# Patient Record
Sex: FEMALE | Race: BLACK OR AFRICAN AMERICAN | Employment: UNEMPLOYED | URBAN - METROPOLITAN AREA
[De-identification: names, ages, dates, MRNs, and addresses within clinical notes are randomized per-mention and may not be internally consistent; named-entity substitution may affect disease eponyms.]

---

## 2017-01-09 ENCOUNTER — TELEPHONE (OUTPATIENT)
Dept: ONCOLOGY | Age: 72
End: 2017-01-09

## 2017-01-09 NOTE — TELEPHONE ENCOUNTER
[employer]  Medical Oncology at [facility]    01/09/17- Patient's sister, Alo Hogan, called because patient is suppose to have labs drawn today, but [facility] is closed today due to the weather. She stated patient is doing well, no complaints. Informed her it would be okay to have labs drawn tomorrow. Dr. Veronica Pruitt notified.

## 2017-01-12 ENCOUNTER — TELEPHONE (OUTPATIENT)
Dept: ONCOLOGY | Age: 72
End: 2017-01-12

## 2017-01-12 NOTE — TELEPHONE ENCOUNTER
[employer]  Medical Oncology at [address] [place] [address]    01/12/17- Informed Shanon Miramontes of results she verbalized understanding and reported patient is feeling good. No new concerns or questions.

## 2017-01-26 ENCOUNTER — TELEPHONE (OUTPATIENT)
Dept: ONCOLOGY | Age: 72
End: 2017-01-26

## 2017-01-26 NOTE — TELEPHONE ENCOUNTER
[employer]  Medical Oncology at [address] [place] [address]    01/26/17- Attempted to call patient's sister, Alo Hogan, but voicemail was full and unable to leave a message.   Will try again later.    -Platelet count 088

## 2017-01-26 NOTE — TELEPHONE ENCOUNTER
Patients sister Kaia Holman called and left a voicemail returning the call from Dr. Mindy Novoa office.  Call back number for Kaia Holman is [phone]

## 2017-01-26 NOTE — TELEPHONE ENCOUNTER
[employer]  Medical Oncology at [address] [place] [address]    01/26/17- Informed Yamile that patient's platelets are stable at 254. She denies further questions or concerns.

## 2017-02-10 ENCOUNTER — OFFICE VISIT (OUTPATIENT)
Dept: ONCOLOGY | Age: 72
End: 2017-02-10

## 2017-02-10 ENCOUNTER — TELEPHONE (OUTPATIENT)
Dept: ONCOLOGY | Age: 72
End: 2017-02-10

## 2017-02-10 VITALS
HEIGHT: 64 IN | DIASTOLIC BLOOD PRESSURE: 63 MMHG | HEART RATE: 57 BPM | WEIGHT: 136 LBS | BODY MASS INDEX: 23.22 KG/M2 | RESPIRATION RATE: 18 BRPM | TEMPERATURE: 95.8 F | SYSTOLIC BLOOD PRESSURE: 133 MMHG

## 2017-02-10 DIAGNOSIS — D69.3 ACUTE ITP (HCC): Primary | ICD-10-CM

## 2017-02-10 NOTE — PROGRESS NOTES
[address] [facility] [place] at [facility]  [phone]    Hematology / Oncology Follow Up    Reason for Visit:   Simeon Waggoner is a 66 y.o. female who is seen for follow up of ITP. Treatment History:    · Diagnosed 6/3/2016  · IVIG 6/3/2016  · Prednisone from 6/3/2016 - approximately 11/15/2016  · IVIG 8/16/2016 - Relapsed once prednisone tapered to 5mg  · Rituximab weekly 9/15/2016 - 10/6/2016 for a total of 4 cycles     History of Present Illness:   She returns for follow up. She reports feeling well. Denies bruising/bleeding. No complaints today. Sister present today. PAST HISTORY: The following sections were reviewed and updated in the EMR as appropriate: PMH, SH, FH, Medications, Allergies. No Known Allergies       Review of Systems: A complete review of systems was obtained, reviewed, and scanned into the EMR. Pertinent findings reviewed above. Physical Exam:     Visit Vitals    /63 (BP 1 Location: Right arm, BP Patient Position: Sitting)    Pulse (!) 57    Temp 95.8 °F (35.4 °C) (Temporal)    Resp 18    Ht 5' 4\" (1.626 m)    Wt 136 lb (61.7 kg)    BMI 23.34 kg/m2     General: No distress  Eyes: PERRLA, anicteric sclerae  HENT: Atraumatic, OP clear  Neck: Supple  Lymphatic: No cervical, supraclavicular, or inguinal adenopathy  Respiratory: CTAB, normal respiratory effort  CV: Normal rate, regular rhythm, no murmurs, no peripheral edema  GI: Soft, nontender, nondistended, no masses, no hepatomegaly, no splenomegaly  MS: Normal gait and station. Digits without clubbing or cyanosis. Skin: No rashes, or petechiae. Normal temperature, turgor, and texture. Psych: Alert, oriented, appropriate affect, normal judgment/insight    Results:     Lab Results   Component Value Date/Time    WBC 10.2 10/27/2016 09:49 AM    HGB 12.6 10/27/2016 09:49 AM    HCT 39.7 10/27/2016 09:49 AM    PLATELET 037 47/84/7713 09:49 AM    MCV 77.7 10/27/2016 09:49 AM    ABS. NEUTROPHILS 6.1 10/06/2016 09:23 AM    Hemoglobin (POC) 14.3 10/06/2016 09:24 AM    Hematocrit (POC) 42 10/06/2016 09:24 AM     Lab Results   Component Value Date/Time    Sodium 138 08/17/2016 05:14 AM    Potassium 3.9 08/17/2016 05:14 AM    Chloride 104 08/17/2016 05:14 AM    CO2 27 08/17/2016 05:14 AM    Glucose 114 08/17/2016 05:14 AM    BUN 14 08/17/2016 05:14 AM    Creatinine 0.93 08/17/2016 05:14 AM    GFR est AA >60 08/17/2016 05:14 AM    GFR est non-AA 59 08/17/2016 05:14 AM    Calcium 9.4 08/17/2016 05:14 AM    Sodium (POC) 140 10/06/2016 09:24 AM    Potassium (POC) 3.5 10/06/2016 09:24 AM    Chloride (POC) 101 10/06/2016 09:24 AM    Glucose (POC) 83 10/06/2016 09:24 AM    BUN (POC) 17 10/06/2016 09:24 AM    Creatinine (POC) 1.0 10/06/2016 09:24 AM    Calcium, ionized (POC) 1.21 10/06/2016 09:24 AM     Lab Results   Component Value Date/Time    Bilirubin, total 0.6 08/17/2016 05:14 AM    ALT (SGPT) 20 08/17/2016 05:14 AM    AST (SGOT) 36 08/17/2016 05:14 AM    Alk. phosphatase 44 08/17/2016 05:14 AM    Protein, total 8.5 08/17/2016 05:14 AM    Albumin 3.2 08/17/2016 05:14 AM    Globulin 5.3 08/17/2016 05:14 AM     Lab Results   Component Value Date/Time    Reticulocyte count 0.8 06/03/2016 01:28 PM    Iron % saturation 16 06/04/2016 03:46 AM    Iron 37 06/04/2016 03:46 AM    TIBC 231 06/04/2016 03:46 AM    Ferritin 128 06/03/2016 01:20 PM    Vitamin B12 231 06/03/2016 01:20 PM    Folate 10.7 06/03/2016 01:20 PM    Homocysteine, plasma 8.1 06/04/2016 04:00 PM    Methylmalonic acid 97 06/04/2016 04:00 PM    Haptoglobin 68 06/04/2016 03:46 AM     06/03/2016 01:28 PM    Hep C  virus Ab Interp.  NONREACTIVE 06/04/2016 03:46 AM    HIV 1/2 Interpretation NONREACTIVE 06/03/2016 02:30 PM     Lab Results   Component Value Date/Time    INR 1.0 06/03/2016 01:28 PM    aPTT 33.5 06/03/2016 01:28 PM    Fibrinogen 250 06/03/2016 03:15 PM     PLATELET (K/uL)   Date Value   10/27/2016 190   10/06/2016 159   09/29/2016 183   09/22/2016 176   09/15/2016 127 (L)   09/06/2016 221   08/23/2016 306   08/18/2016 89 (L)   08/17/2016 36 (LL)   08/16/2016 20 (LL)   07/29/2016 231   06/08/2016 231   06/05/2016 66 (L)   06/04/2016 59 (L)   06/04/2016 40 (LL)   06/03/2016 39 (LL)   06/03/2016 3 (LL)     Platelet count: (Full CBC scanned into media)  02/06/2017 242  01/24/2017 254  01/10/2017 278  12/27/2017 243  12/12/2017 247  11/28/2016 250  11/21/2016 250  11/14/2016 254 Prednisone stopped  11/07/2016 247 Pred dropped to 2.5 mg every other day  10/31/2016 210 Pred dropped to 2.5mg   10/27/2016 190 Pred dropped to 5mg  10/17/2016 157    10/10/2016 116 Pred dropped to 10mg 10/12  10/06/2016 159 Rituximab completed  09/29/2016 183 Pred dropped to 20mg   09/22/2016 176  09/15/2016 127 Rituximab weekly started  09/12/2016 129  09/06/2016 221  08/29/2016 269 Pred dropped to 40mg 8/30 08/23/2016 306  08/18/2016 89  08/16/2016 20 Pred increased to 60mg and IVIG x1  08/15/2016 5  07/20/2016 231  07/18/2016 274  Pred dropped to 10 mg 7/20 07/11/2016 269  07/05/2016 186  06/27/2016 146  06/20/2016 230   Pred dropped to 20 mg 6/22 06/14/2016 320 Pred dropped to 40 mg 6/15  06/08/2016 231  06/03/2016 3  Pred started at 60 mg daily and IVIG x1 dose    EGD 6/13/2016 by Dr. Logan Campoverde:    H. Pylori negative   Esophagus:normal, early schatzki's ring that appeared widely patent   Stomach: Small size hiatal hernia, otherwise mucosa within normal.   Duodenum/jejunum: normal    Colonoscopy 6/13/2016 by Dr. Logan Campoverde:    Terminal Ileum: normal   Cecum: normal   Ascending Colon: normal   Transverse Colon: normal   Descending Colon: normal   Sigmoid: normal   Rectum: normal, small internal hemorrhoids       Assessment:   1) ITP, remission  Diagnosed 6/3/2016, responded initially to IVIG and steroids, but relapsed when prednisone tapered to 5mg. Responded once again to IVIG and prednisone, but platelets fell while still on prednisone 40mg.  S/p Rituximab weekly x 4 cycles. She continues feeling well and platelets remain normal. We will decrease lab recheck to every 3 months for surveillance and see her back at that time. 2) Bone health  Continue calcium and vitamin D BID. 3) GIB  Melena resolved. S/p EGD and colonoscopy in past.    4) Vaccine schedule  We will resume vaccines now that she is 3 months out from Rituxin. 1. Pneumococcal vaccine  Recommendation for 2 pneumococcal Vaccines. PCV13 Then at least 8 weeks later PPSV23 And no booster needed     PCV13 received on 8/18/2016  PPSV23 Due after 10/13/2016     2. Haemophilus influenzae vaccine  Recommendation of one dose and no booster needed   Hib received on 8/18/2016     3. Meningococcal vaccine  Recommendation for age > 56 years 2 doses of (a.) Menveo or Menactra 2 months apart With booster every 5 years and (b.) Bexsero or Trumenba 2 times at least 1 month apart then no additional booster needed     (a.) Menveo received 8/18/2016 ; repeat dose due after 10/13/2016 and then booster every 5 years      (b.) Bexsero not available in the hospital; received as  outpatient injection at [facility] on  8/22/2016 ; will need 2nd dose on 1 month later; due 9/23/2016. Prescriptions faxed to [facility] for Menveo, Bexsero and PPSV23.      4. Annual flu vaccine recommended   Okay to proceed now. Prescription sent to [facility].        Plan:     · Vaccines: Menveo, Bexsero, PPSV23 and influenza  · Labs every 3 months: CBC ([facility]-standing order)  · Continue Calcium and vitamin D BID  · Return to clinic in about 3 months    Signed By: Paul Maya MD     February 10, 2017

## 2017-02-10 NOTE — TELEPHONE ENCOUNTER
[facility]  Medical Oncology at [facility]    02/10/17- Vaccine orders faxed to [facility] at [phone], confirmation received. Patient's sister, Dewayne Hatch, notified.

## 2017-02-10 NOTE — MR AVS SNAPSHOT
Visit Information Date & Time Provider Department Dept. Phone Encounter #  
 2/10/2017 10:45 AM Adolph Cole NP [address] at [facility] [address] [id number] Follow-up Instructions Return in about 3 months (around 5/10/2017) for Marco. Your Appointments 5/12/2017 10:45 AM  
ESTABLISHED PATIENT with Adolph Cole NP  
[place] On[facility] at [facility] Appt Note: Marco [address] [facility], [address] [facility] [address]  
[phone]  
  
   
 [address] [facility], [address] [place] [address] Upcoming Health Maintenance Date Due DTaP/Tdap/Td series (1 - Tdap) 8/3/1965 BREAST CANCER SCRN MAMMOGRAM 8/3/1994 ZOSTER VACCINE AGE 60> 8/3/2004 GLAUCOMA SCREENING Q2Y 8/3/2009 OSTEOPOROSIS SCREENING (DEXA) 8/3/2009 MEDICARE YEARLY EXAM 8/3/2009 INFLUENZA AGE 9 TO ADULT 8/1/2016 FOBT Q 1 YEAR AGE 50-75 6/3/2017 Pneumococcal 65+ Low/Medium Risk (2 of 2 - PPSV23) 8/18/2017 Allergies as of 2/10/2017  Review Complete On: 2/10/2017 By: Adolph Cole NP No Known Allergies Current Immunizations  Reviewed on 10/6/2016 Name Date Hib (PRP-T) 8/18/2016  1:49 PM  
 Meningococcal (MCV4O) Vaccine 8/18/2016  1:47 PM  
 Pneumococcal Conjugate (PCV-13) 8/18/2016  1:48 PM  
  
 Not reviewed this visit You Were Diagnosed With   
  
 Codes Comments Acute ITP (HCC)    -  Primary ICD-10-CM: B04.9 ICD-9-CM: 287.31 Vitals BP Pulse Temp Resp Height(growth percentile) 133/63 (BP 1 Location: Right arm, BP Patient Position: Sitting) (!) 57 95.8 °F (35.4 °C) (Temporal) 18 5' 4\" (1.626 m) Weight(growth percentile) BMI OB Status Smoking Status 136 lb (61.7 kg) 23.34 kg/m2 Postmenopausal Never Smoker BMI and BSA Data Body Mass Index Body Surface Area  
 23.34 kg/m 2 1.67 m 2 Preferred Pharmacy Pharmacy Name Phone [facility], [facility] [phone] Your Updated Medication List  
  
   
This list is accurate as of: 2/10/17 11:26 AM.  Always use your most recent med list.  
  
  
  
  
 ondansetron 8 mg disintegrating tablet Commonly known as:  ZOFRAN ODT Take 1 Tab by mouth every eight (8) hours as needed for Nausea. predniSONE 2.5 mg tablet Commonly known as:  Therman Rail Take 1 tab by mouth daily for one week then 1 tab by mouth every other day x 1 week then stop. Follow-up Instructions Return in about 3 months (around 5/10/2017) for Marco. Introducing [facility]! Thu Crawley introduces [employer] patient portal. Now you can access parts of your medical record, email your doctor's office, and request medication refills online. 1. In your internet browser, go to [email or web address] 2. Click on the First Time User? Click Here link in the Sign In box. You will see the New Member Sign Up page. 3. Enter your [employer] Access Code exactly as it appears below. You will not need to use this code after youve completed the sign-up process. If you do not sign up before the expiration date, you must request a new code. · [employer] Access Code: [email or web address] Expires: 5/11/2017 10:56 AM 
 
4. Enter the last four digits of your Social Security Number (xxxx) and Date of Birth (mm/dd/yyyy) as indicated and click Submit. You will be taken to the next sign-up page. 5. Create a Pinstant Karmat ID. This will be your [employer] login ID and cannot be changed, so think of one that is secure and easy to remember. 6. Create a [employer] password. You can change your password at any time. 7. Enter your Password Reset Question and Answer. This can be used at a later time if you forget your password. 8. Enter your e-mail address. You will receive e-mail notification when new information is available in [address] 9. Click Sign Up. You can now view and download portions of your medical record. 10. Click the Download Summary menu link to download a portable copy of your medical information. If you have questions, please visit the Frequently Asked Questions section of the [email or web address] website. Remember, [email or web address] is NOT to be used for urgent needs. For medical emergencies, dial 911. Now available from your iPhone and Android! Please provide this summary of care documentation to your next provider. Your primary care clinician is listed as Martínez Reynolds . If you have any questions after today's visit, please call [phone].

## 2017-02-13 ENCOUNTER — TELEPHONE (OUTPATIENT)
Dept: ONCOLOGY | Age: 72
End: 2017-02-13

## 2017-02-13 NOTE — TELEPHONE ENCOUNTER
[facility] Oncology at [facility]: Call placed to return patient's Tonya Oneill, phone call. Voicemail is full, unable to leave message. 2/16 1115: Call placed to patient's sister, Chapo Gardner. She states [facility] has been unable to order one of the meningitis vaccines but she isn't sure which one. She states she is taking patient to the pharmacy later today to get remainder of vaccines. Call placed to [facility]. They are unable to get Menveo vaccine. Call placed to [address] [facility] [phone] to see if they can get vaccine for patient- spoke with nurse, Nasra Lawson, who states she doesn't have the vaccine but will see if she can get it and call me back. 2/22 Received email from Nasra Lawson with [facility] who is able to get vaccine and bill patient's insurance. She requests that I verify with patient that she wants vaccine and they can get her set up to receive it. 1230: Call placed to Chapo Gardner to advise her that [facility] if that's okay with them. Left message for return call. 1315: Spoke with Chapo Gardner to advise her as above. She states that's perfect and they can go to the Health Dept without an issue. Contacted Elo with [facility] to let her know and she will reach out to patient.

## 2017-02-13 NOTE — TELEPHONE ENCOUNTER
Alley Crooks called and left a voicemail requesting a return call from Ck Jimenez concerning [place].  Call back number for Alley Crooks is [phone]

## 2017-02-22 NOTE — TELEPHONE ENCOUNTER
Patient's sister, Isabel Alfaro, left  requesting a return call from Amelia Lerma as she was calling her back. # [phone]. Thanks.

## 2017-05-12 ENCOUNTER — OFFICE VISIT (OUTPATIENT)
Dept: ONCOLOGY | Age: 72
End: 2017-05-12

## 2017-05-12 VITALS
RESPIRATION RATE: 20 BRPM | HEART RATE: 57 BPM | WEIGHT: 139.6 LBS | DIASTOLIC BLOOD PRESSURE: 57 MMHG | OXYGEN SATURATION: 100 % | SYSTOLIC BLOOD PRESSURE: 135 MMHG | BODY MASS INDEX: 23.83 KG/M2 | TEMPERATURE: 95.5 F | HEIGHT: 64 IN

## 2017-05-12 DIAGNOSIS — D69.3 ACUTE ITP (HCC): Primary | ICD-10-CM

## 2017-05-12 RX ORDER — ATORVASTATIN CALCIUM 20 MG/1
TABLET, FILM COATED ORAL
Refills: 0 | COMMUNITY
Start: 2017-05-09 | End: 2017-05-12 | Stop reason: ALTCHOICE

## 2017-05-12 NOTE — PROGRESS NOTES
[address] [facility] Oncology at [address] [place] [address]  [phone]    Hematology / Oncology Follow Up    Reason for Visit:   Edgardo Dooley is a 66 y.o. female who is seen for follow up of ITP. Treatment History:    · Diagnosed 6/3/2016  · IVIG 6/3/2016  · Prednisone from 6/3/2016 - approximately 11/15/2016  · IVIG 8/16/2016 - Relapsed once prednisone tapered to 5mg  · Rituximab weekly 9/15/2016 - 10/6/2016 for a total of 4 cycles     History of Present Illness:   She reports feeling well. No bleeding. Started a statin for cholesterol wit her PCP recently. No fevers, chills, night sweats, weight loss, adenopathy. Sister present today. PAST HISTORY: The following sections were reviewed and updated in the EMR as appropriate: PMH, SH, FH, Medications, Allergies. No Known Allergies       Review of Systems: A complete review of systems was obtained, reviewed, and scanned into the EMR. Pertinent findings reviewed above. Physical Exam:     Visit Vitals    /57 (BP 1 Location: Left arm, BP Patient Position: Sitting)  Comment: .  Pulse (!) 57    Temp 95.5 °F (35.3 °C) (Temporal)    Resp 20    Ht 5' 4\" (1.626 m)    Wt 139 lb 9.6 oz (63.3 kg)    SpO2 100%    BMI 23.96 kg/m2     General: No distress  Eyes: PERRLA, anicteric sclerae  HENT: Atraumatic, OP clear  Neck: Supple  Lymphatic: No cervical, supraclavicular, or inguinal adenopathy  Respiratory: CTAB, normal respiratory effort  CV: Normal rate, regular rhythm, no murmurs, no peripheral edema  GI: Soft, nontender, nondistended, no masses, no hepatomegaly, no splenomegaly  MS: Normal gait and station. Digits without clubbing or cyanosis. Skin: No rashes, or petechiae. Normal temperature, turgor, and texture.     Psych: Alert, oriented, appropriate affect, normal judgment/insight    Results:     Lab Results   Component Value Date/Time    WBC 10.2 10/27/2016 09:49 AM    HGB 12.6 10/27/2016 09:49 AM    HCT 39.7 10/27/2016 09:49 AM PLATELET 071 97/12/2252 09:49 AM    MCV 77.7 10/27/2016 09:49 AM    ABS. NEUTROPHILS 6.1 10/06/2016 09:23 AM    Hemoglobin (POC) 14.3 10/06/2016 09:24 AM    Hematocrit (POC) 42 10/06/2016 09:24 AM     Lab Results   Component Value Date/Time    Sodium 138 08/17/2016 05:14 AM    Potassium 3.9 08/17/2016 05:14 AM    Chloride 104 08/17/2016 05:14 AM    CO2 27 08/17/2016 05:14 AM    Glucose 114 08/17/2016 05:14 AM    BUN 14 08/17/2016 05:14 AM    Creatinine 0.93 08/17/2016 05:14 AM    GFR est AA >60 08/17/2016 05:14 AM    GFR est non-AA 59 08/17/2016 05:14 AM    Calcium 9.4 08/17/2016 05:14 AM    Sodium (POC) 140 10/06/2016 09:24 AM    Potassium (POC) 3.5 10/06/2016 09:24 AM    Chloride (POC) 101 10/06/2016 09:24 AM    Glucose (POC) 83 10/06/2016 09:24 AM    BUN (POC) 17 10/06/2016 09:24 AM    Creatinine (POC) 1.0 10/06/2016 09:24 AM    Calcium, ionized (POC) 1.21 10/06/2016 09:24 AM     Lab Results   Component Value Date/Time    Bilirubin, total 0.6 08/17/2016 05:14 AM    ALT (SGPT) 20 08/17/2016 05:14 AM    AST (SGOT) 36 08/17/2016 05:14 AM    Alk. phosphatase 44 08/17/2016 05:14 AM    Protein, total 8.5 08/17/2016 05:14 AM    Albumin 3.2 08/17/2016 05:14 AM    Globulin 5.3 08/17/2016 05:14 AM     Lab Results   Component Value Date/Time    Reticulocyte count 0.8 06/03/2016 01:28 PM    Iron % saturation 16 06/04/2016 03:46 AM    Iron 37 06/04/2016 03:46 AM    TIBC 231 06/04/2016 03:46 AM    Ferritin 128 06/03/2016 01:20 PM    Vitamin B12 231 06/03/2016 01:20 PM    Folate 10.7 06/03/2016 01:20 PM    Homocysteine, plasma 8.1 06/04/2016 04:00 PM    Methylmalonic acid 97 06/04/2016 04:00 PM    Haptoglobin 68 06/04/2016 03:46 AM     06/03/2016 01:28 PM    Hep C  virus Ab Interp.  NONREACTIVE 06/04/2016 03:46 AM    HIV 1/2 Interpretation NONREACTIVE 06/03/2016 02:30 PM     Lab Results   Component Value Date/Time    INR 1.0 06/03/2016 01:28 PM    aPTT 33.5 06/03/2016 01:28 PM    Fibrinogen 250 06/03/2016 03:15 PM PLATELET (K/uL)   Date Value   10/27/2016 190   10/06/2016 159   09/29/2016 183   09/22/2016 176   09/15/2016 127 (L)   09/06/2016 221   08/23/2016 306   08/18/2016 89 (L)   08/17/2016 36 (LL)   08/16/2016 20 (LL)   07/29/2016 231   06/08/2016 231   06/05/2016 66 (L)   06/04/2016 59 (L)   06/04/2016 40 (LL)   06/03/2016 39 (LL)   06/03/2016 3 (LL)       Platelet count: (Full CBC scanned into media)  05/08/2017 248  02/06/2017 242  01/24/2017 254  01/10/2017 278  12/27/2017 243  12/12/2017 247  11/28/2016 250  11/21/2016 250  11/14/2016 254 Prednisone stopped  11/07/2016 247 Pred dropped to 2.5 mg every other day  10/31/2016 210 Pred dropped to 2.5mg   10/27/2016 190 Pred dropped to 5mg  10/17/2016 157    10/10/2016 116 Pred dropped to 10mg 10/12  10/06/2016 159 Rituximab completed  09/29/2016 183 Pred dropped to 20mg   09/22/2016 176  09/15/2016 127 Rituximab weekly started  09/12/2016 129  09/06/2016 221  08/29/2016 269 Pred dropped to 40mg 8/30 08/23/2016 306  08/18/2016 89  08/16/2016 20 Pred increased to 60mg and IVIG x1  08/15/2016 5  07/20/2016 231  07/18/2016 274  Pred dropped to 10 mg 7/20  07/11/2016 269  07/05/2016 186  06/27/2016 146  06/20/2016 230   Pred dropped to 20 mg 6/22 06/14/2016 320 Pred dropped to 40 mg 6/15  06/08/2016 231  06/03/2016 3  Pred started at 60 mg daily and IVIG x1 dose    EGD 6/13/2016 by Dr. Terry Louis:    H. Pylori negative   Esophagus:normal, early schatzki's ring that appeared widely patent   Stomach: Small size hiatal hernia, otherwise mucosa within normal.   Duodenum/jejunum: normal    Colonoscopy 6/13/2016 by Dr. Terry Louis:    Terminal Ileum: normal   Cecum: normal   Ascending Colon: normal   Transverse Colon: normal   Descending Colon: normal   Sigmoid: normal   Rectum: normal, small internal hemorrhoids       Assessment:   1) ITP, remission  Diagnosed 6/3/2016, responded initially to IVIG and steroids, but relapsed when prednisone tapered to 5mg.   Responded once again to IVIG and prednisone, but platelets fell while still on prednisone 40mg. S/p Rituximab weekly x 4 cycles. She continues feeling well and platelets remain normal.  Continue to monitor every 3 months. 2) Bone health  Continue vitamin D. 3) GIB  Melena resolved. S/p EGD and colonoscopy in past.    4) Vaccine schedule  As of 3/2017 she has completed the vaccine schedule for pneumoccal, Hflu, and meninogoccal vaccines, in case she needs a splenectomy in the future.     Plan:     · Labs every 3 months: CBC ([facility]-standing order)  · Return to clinic in about 3 months    Signed By: Nuris Johansen MD     May 12, 2017

## 2017-08-10 ENCOUNTER — OFFICE VISIT (OUTPATIENT)
Dept: ONCOLOGY | Age: 72
End: 2017-08-10

## 2017-08-10 VITALS
TEMPERATURE: 95.5 F | HEART RATE: 60 BPM | DIASTOLIC BLOOD PRESSURE: 60 MMHG | BODY MASS INDEX: 24.07 KG/M2 | SYSTOLIC BLOOD PRESSURE: 141 MMHG | OXYGEN SATURATION: 98 % | HEIGHT: 64 IN | WEIGHT: 141 LBS | RESPIRATION RATE: 20 BRPM

## 2017-08-10 DIAGNOSIS — D69.3 ACUTE ITP (HCC): Primary | ICD-10-CM

## 2017-08-10 NOTE — MR AVS SNAPSHOT
Visit Information Date & Time Provider Department Dept. Phone Encounter #  
 8/10/2017 10:30 AM [facility] [address] [place] [address] [id number] Follow-up Instructions Return in about 3 months (around 11/10/2017) for SHANKAR Graham fu.  
 Follow-up and Disposition History Upcoming Health Maintenance Date Due DTaP/Tdap/Td series (1 - Tdap) 8/3/1965 BREAST CANCER SCRN MAMMOGRAM 8/3/1994 ZOSTER VACCINE AGE 60> 6/3/2004 GLAUCOMA SCREENING Q2Y 8/3/2009 OSTEOPOROSIS SCREENING (DEXA) 8/3/2009 MEDICARE YEARLY EXAM 8/3/2009 FOBT Q 1 YEAR AGE 50-75 6/3/2017 INFLUENZA AGE 9 TO ADULT 8/1/2017 Pneumococcal 65+ Low/Medium Risk (2 of 2 - PPSV23) 8/18/2017 Allergies as of 8/10/2017  Review Complete On: 8/10/2017 By: Dario Santos MD  
 No Known Allergies Current Immunizations  Reviewed on 10/6/2016 Name Date Hib (PRP-T) 8/18/2016  1:49 PM  
 Meningococcal (MCV4O) Vaccine 8/18/2016  1:47 PM  
 Pneumococcal Conjugate (PCV-13) 8/18/2016  1:48 PM  
  
 Not reviewed this visit You Were Diagnosed With   
  
 Codes Comments Acute ITP (HCC)    -  Primary ICD-10-CM: R95.0 ICD-9-CM: 287.31 Vitals BP Pulse Temp Resp Height(growth percentile) 141/60 (BP 1 Location: Right arm, BP Patient Position: Sitting) 60 95.5 °F (35.3 °C) (Temporal) 20 5' 4\" (1.626 m) Weight(growth percentile) SpO2 BMI OB Status Smoking Status 141 lb (64 kg) 98% 24.2 kg/m2 Postmenopausal Never Smoker Vitals History BMI and BSA Data Body Mass Index Body Surface Area  
 24.2 kg/m 2 1.7 m 2 Preferred Pharmacy Pharmacy Name Phone [facility], [address] [facility] [phone] Your Updated Medication List  
  
   
This list is accurate as of: 8/10/17 11:12 AM.  Always use your most recent med list.  
  
  
  
  
 PRAVASTATIN PO Take  by mouth. We Performed the Following CBC W/O DIFF [76368 CPT(R)] Follow-up Instructions Return in about 3 months (around 11/10/2017) for SHANKAR Graham.  
  
  
Introducing [facility] SERVICES! [facility] introduces [employer] patient portal. Now you can access parts of your medical record, email your doctor's office, and request medication refills online. 1. In your internet browser, go to [email or web address] 2. Click on the First Time User? Click Here link in the Sign In box. You will see the New Member Sign Up page. 3. Enter your Hipbone Access Code exactly as it appears below. You will not need to use this code after youve completed the sign-up process. If you do not sign up before the expiration date, you must request a new code. · Hipbone Access Code: [email or web address] Expires: 11/8/2017 11:12 AM 
 
4. Enter the last four digits of your Social Security Number (xxxx) and Date of Birth (mm/dd/yyyy) as indicated and click Submit. You will be taken to the next sign-up page. 5. Create a Hipbone ID. This will be your Hipbone login ID and cannot be changed, so think of one that is secure and easy to remember. 6. Create a Hipbone password. You can change your password at any time. 7. Enter your Password Reset Question and Answer. This can be used at a later time if you forget your password. 8. Enter your e-mail address. You will receive e-mail notification when new information is available in [address] 9. Click Sign Up. You can now view and download portions of your medical record. 10. Click the Download Summary menu link to download a portable copy of your medical information. If you have questions, please visit the Frequently Asked Questions section of the Hipbone website. Remember, Hipbone is NOT to be used for urgent needs. For medical emergencies, dial 911. Now available from your iPhone and Android! Please provide this summary of care documentation to your next provider. Your primary care clinician is listed as Sera Lantigua . If you have any questions after today's visit, please call [phone].

## 2017-08-10 NOTE — PROGRESS NOTES
[address] [facility] Oncology at [facility]  [phone]    Hematology / Oncology Follow Up    Reason for Visit:   Radha Portillo is a 67 y.o. female who is seen for follow up of ITP. Treatment History:    · Diagnosed 6/3/2016  · IVIG 6/3/2016  · Prednisone from 6/3/2016 - approximately 11/15/2016  · IVIG 8/16/2016 - Relapsed once prednisone tapered to 5mg  · Rituximab weekly 9/15/2016 - 10/6/2016 for a total of 4 cycles     History of Present Illness:   She has been doing well since our last visit. No bleeding, bruising. Good energy. No fevers, chills, night sweats, weight loss, adenopathy. Sister present today. PAST HISTORY: The following sections were reviewed and updated in the EMR as appropriate: PMH, SH, FH, Medications, Allergies. No Known Allergies       Review of Systems: A complete review of systems was obtained, reviewed, and scanned into the EMR. Pertinent findings reviewed above. Physical Exam:     Visit Vitals    /60 (BP 1 Location: Right arm, BP Patient Position: Sitting)  Comment: .  Pulse 60    Temp 95.5 °F (35.3 °C) (Temporal)    Resp 20    Ht 5' 4\" (1.626 m)    Wt 141 lb (64 kg)    SpO2 98%    BMI 24.2 kg/m2     General: No distress  Eyes: PERRLA, anicteric sclerae  HENT: Atraumatic, OP clear  Neck: Supple  Lymphatic: No cervical, supraclavicular, or inguinal adenopathy  Respiratory: CTAB, normal respiratory effort  CV: Normal rate, regular rhythm, no murmurs, no peripheral edema  GI: Soft, nontender, nondistended, no masses, no hepatomegaly, no splenomegaly  MS: Normal gait and station. Digits without clubbing or cyanosis. Skin: No rashes, or petechiae. Normal temperature, turgor, and texture.     Psych: Alert, oriented, appropriate affect, normal judgment/insight    Results:     Lab Results   Component Value Date/Time    WBC 10.2 10/27/2016 09:49 AM    HGB 12.6 10/27/2016 09:49 AM    HCT 39.7 10/27/2016 09:49 AM    PLATELET 817 10/27/2016 09:49 AM    MCV 77.7 10/27/2016 09:49 AM    ABS. NEUTROPHILS 6.1 10/06/2016 09:23 AM    Hemoglobin (POC) 14.3 10/06/2016 09:24 AM    Hematocrit (POC) 42 10/06/2016 09:24 AM     Lab Results   Component Value Date/Time    Sodium 138 08/17/2016 05:14 AM    Potassium 3.9 08/17/2016 05:14 AM    Chloride 104 08/17/2016 05:14 AM    CO2 27 08/17/2016 05:14 AM    Glucose 114 08/17/2016 05:14 AM    BUN 14 08/17/2016 05:14 AM    Creatinine 0.93 08/17/2016 05:14 AM    GFR est AA >60 08/17/2016 05:14 AM    GFR est non-AA 59 08/17/2016 05:14 AM    Calcium 9.4 08/17/2016 05:14 AM    Sodium (POC) 140 10/06/2016 09:24 AM    Potassium (POC) 3.5 10/06/2016 09:24 AM    Chloride (POC) 101 10/06/2016 09:24 AM    Glucose (POC) 83 10/06/2016 09:24 AM    BUN (POC) 17 10/06/2016 09:24 AM    Creatinine (POC) 1.0 10/06/2016 09:24 AM    Calcium, ionized (POC) 1.21 10/06/2016 09:24 AM     Lab Results   Component Value Date/Time    Bilirubin, total 0.6 08/17/2016 05:14 AM    ALT (SGPT) 20 08/17/2016 05:14 AM    AST (SGOT) 36 08/17/2016 05:14 AM    Alk. phosphatase 44 08/17/2016 05:14 AM    Protein, total 8.5 08/17/2016 05:14 AM    Albumin 3.2 08/17/2016 05:14 AM    Globulin 5.3 08/17/2016 05:14 AM     Lab Results   Component Value Date/Time    Reticulocyte count 0.8 06/03/2016 01:28 PM    Iron % saturation 16 06/04/2016 03:46 AM    TIBC 231 06/04/2016 03:46 AM    Ferritin 128 06/03/2016 01:20 PM    Vitamin B12 231 06/03/2016 01:20 PM    Folate 10.7 06/03/2016 01:20 PM    Homocysteine, plasma 8.1 06/04/2016 04:00 PM    Methylmalonic acid 97 06/04/2016 04:00 PM    Haptoglobin 68 06/04/2016 03:46 AM     06/03/2016 01:28 PM    Hep C  virus Ab Interp.  NONREACTIVE 06/04/2016 03:46 AM    HIV 1/2 Interpretation NONREACTIVE 06/03/2016 02:30 PM     Lab Results   Component Value Date/Time    INR 1.0 06/03/2016 01:28 PM    aPTT 33.5 06/03/2016 01:28 PM    Fibrinogen 250 06/03/2016 03:15 PM     PLATELET (K/uL)   Date Value   10/27/2016 190 10/06/2016 159   09/29/2016 183   09/22/2016 176   09/15/2016 127 (L)   09/06/2016 221   08/23/2016 306   08/18/2016 89 (L)   08/17/2016 36 (LL)   08/16/2016 20 (LL)   07/29/2016 231   06/08/2016 231   06/05/2016 66 (L)   06/04/2016 59 (L)   06/04/2016 40 (LL)   06/03/2016 39 (LL)   06/03/2016 3 (LL)       Platelet count: (Full CBC scanned into media)  08/08/2017 225  05/08/2017 248  02/06/2017 242  01/24/2017 254  01/10/2017 278  12/27/2017 243  12/12/2017 247  11/28/2016 250  11/21/2016 250  11/14/2016 254 Prednisone stopped  11/07/2016 247 Pred dropped to 2.5 mg every other day  10/31/2016 210 Pred dropped to 2.5mg   10/27/2016 190 Pred dropped to 5mg  10/17/2016 157    10/10/2016 116 Pred dropped to 10mg 10/12  10/06/2016 159 Rituximab completed  09/29/2016 183 Pred dropped to 20mg   09/22/2016 176  09/15/2016 127 Rituximab weekly started  09/12/2016 129  09/06/2016 221  08/29/2016 269 Pred dropped to 40mg 8/30 08/23/2016 306  08/18/2016 89  08/16/2016 20 Pred increased to 60mg and IVIG x1  08/15/2016 5  07/20/2016 231  07/18/2016 274  Pred dropped to 10 mg 7/20 07/11/2016 269  07/05/2016 186  06/27/2016 146  06/20/2016 230   Pred dropped to 20 mg 6/22 06/14/2016 320 Pred dropped to 40 mg 6/15  06/08/2016 231  06/03/2016 3  Pred started at 60 mg daily and IVIG x1 dose    EGD 6/13/2016 by Dr. Twin Lindsey:    H. Pylori negative   Esophagus:normal, early schatzki's ring that appeared widely patent   Stomach: Small size hiatal hernia, otherwise mucosa within normal.   Duodenum/jejunum: normal    Colonoscopy 6/13/2016 by Dr. Twin Rouleau:    Terminal Ileum: normal   Cecum: normal   Ascending Colon: normal   Transverse Colon: normal   Descending Colon: normal   Sigmoid: normal   Rectum: normal, small internal hemorrhoids       Assessment:   1) ITP, remission  Diagnosed 6/3/2016, responded initially to IVIG and steroids, but relapsed when prednisone tapered to 5mg.   Responded once again to IVIG and prednisone, but platelets fell while still on prednisone 40mg. S/p Rituximab weekly x 4 cycles and now in remission. She continues feeling well and platelets remain normal.  Continue to monitor every 3 months. 2) Vaccine schedule  As of 3/2017 she has completed the vaccine schedule for pneumoccal, Hflu, and meninogoccal vaccines, in case she needs a splenectomy in the future.     Plan:     · Labs every 3 months: CBC ([facility]-standing order)  · Return to clinic in about 3 months    Signed By: Awa Berkowitz MD     August 10, 2017

## 2017-11-16 ENCOUNTER — OFFICE VISIT (OUTPATIENT)
Dept: ONCOLOGY | Age: 72
End: 2017-11-16

## 2017-11-16 VITALS
DIASTOLIC BLOOD PRESSURE: 61 MMHG | BODY MASS INDEX: 24.75 KG/M2 | RESPIRATION RATE: 20 BRPM | HEART RATE: 63 BPM | TEMPERATURE: 96 F | HEIGHT: 64 IN | SYSTOLIC BLOOD PRESSURE: 141 MMHG | OXYGEN SATURATION: 99 % | WEIGHT: 145 LBS

## 2017-11-16 DIAGNOSIS — D69.3 ACUTE ITP (HCC): Primary | ICD-10-CM

## 2017-11-16 NOTE — PROGRESS NOTES
[facility] [place] at [address] [facility]  Pennie Vargheses: [phone]  F: [phone]      Reason for Visit:   Natalee Miramontes is a 67 y.o. female who is seen for follow up of ITP. Treatment History:    · Diagnosed 6/3/2016  · IVIG 6/3/2016  · Prednisone from 6/3/2016 - approximately 11/15/2016  · IVIG 8/16/2016 - Relapsed once prednisone tapered to 5mg  · Rituximab weekly 9/15/2016 - 10/6/2016 for a total of 4 cycles     History of Present Illness:   She reports doing well. No bleeding. No bruising. No recent infections. No fevers, chills, night sweats, unintentional weight loss, adenopathy. Good energy. Basically no change. Sister present today. PAST HISTORY: The following sections were reviewed and updated in the EMR as appropriate: PMH, SH, FH, Medications, Allergies. No Known Allergies       Review of Systems: A complete review of systems was obtained, reviewed, and scanned into the EMR. Pertinent findings reviewed above. Physical Exam:     Visit Vitals    /61 (BP 1 Location: Right arm, BP Patient Position: Sitting)  Comment: .  Pulse 63    Temp 96 °F (35.6 °C) (Temporal)    Resp 20    Ht 5' 4\" (1.626 m)    Wt 145 lb (65.8 kg)    SpO2 99%    BMI 24.89 kg/m2     General: No distress  Eyes: PERRLA, anicteric sclerae  HENT: Atraumatic, OP clear  Neck: Supple  Lymphatic: No cervical, supraclavicular, or inguinal adenopathy  Respiratory: CTAB, normal respiratory effort  CV: Normal rate, regular rhythm, no murmurs, no peripheral edema  GI: Soft, nontender, nondistended, no masses, no hepatomegaly, no splenomegaly  MS: Normal gait and station. Digits without clubbing or cyanosis. Skin: No rashes, or petechiae. Normal temperature, turgor, and texture.     Psych: Alert, oriented, appropriate affect, normal judgment/insight    Results:     Lab Results   Component Value Date/Time    WBC 10.2 10/27/2016 09:49 AM    HGB 12.6 10/27/2016 09:49 AM    HCT 39.7 10/27/2016 09:49 AM    PLATELET 935 47/55/9026 09:49 AM    MCV 77.7 10/27/2016 09:49 AM    ABS. NEUTROPHILS 6.1 10/06/2016 09:23 AM    Hemoglobin (POC) 14.3 10/06/2016 09:24 AM    Hematocrit (POC) 42 10/06/2016 09:24 AM     Lab Results   Component Value Date/Time    Sodium 138 08/17/2016 05:14 AM    Potassium 3.9 08/17/2016 05:14 AM    Chloride 104 08/17/2016 05:14 AM    CO2 27 08/17/2016 05:14 AM    Glucose 114 08/17/2016 05:14 AM    BUN 14 08/17/2016 05:14 AM    Creatinine 0.93 08/17/2016 05:14 AM    GFR est AA >60 08/17/2016 05:14 AM    GFR est non-AA 59 08/17/2016 05:14 AM    Calcium 9.4 08/17/2016 05:14 AM    Sodium (POC) 140 10/06/2016 09:24 AM    Potassium (POC) 3.5 10/06/2016 09:24 AM    Chloride (POC) 101 10/06/2016 09:24 AM    Glucose (POC) 83 10/06/2016 09:24 AM    BUN (POC) 17 10/06/2016 09:24 AM    Creatinine (POC) 1.0 10/06/2016 09:24 AM    Calcium, ionized (POC) 1.21 10/06/2016 09:24 AM     Lab Results   Component Value Date/Time    Bilirubin, total 0.6 08/17/2016 05:14 AM    ALT (SGPT) 20 08/17/2016 05:14 AM    AST (SGOT) 36 08/17/2016 05:14 AM    Alk. phosphatase 44 08/17/2016 05:14 AM    Protein, total 8.5 08/17/2016 05:14 AM    Albumin 3.2 08/17/2016 05:14 AM    Globulin 5.3 08/17/2016 05:14 AM     Lab Results   Component Value Date/Time    Reticulocyte count 0.8 06/03/2016 01:28 PM    Iron % saturation 16 06/04/2016 03:46 AM    TIBC 231 06/04/2016 03:46 AM    Ferritin 128 06/03/2016 01:20 PM    Vitamin B12 231 06/03/2016 01:20 PM    Folate 10.7 06/03/2016 01:20 PM    Homocysteine, plasma 8.1 06/04/2016 04:00 PM    Methylmalonic acid 97 06/04/2016 04:00 PM    Haptoglobin 68 06/04/2016 03:46 AM     06/03/2016 01:28 PM    Hep C  virus Ab Interp.  NONREACTIVE 06/04/2016 03:46 AM    HIV 1/2 Interpretation NONREACTIVE 06/03/2016 02:30 PM     Lab Results   Component Value Date/Time    INR 1.0 06/03/2016 01:28 PM    aPTT 33.5 06/03/2016 01:28 PM    Fibrinogen 250 06/03/2016 03:15 PM     PLATELET (K/uL)   Date Value   10/27/2016 190   10/06/2016 159   09/29/2016 183   09/22/2016 176   09/15/2016 127 (L)   09/06/2016 221   08/23/2016 306   08/18/2016 89 (L)   08/17/2016 36 (LL)   08/16/2016 20 (LL)   07/29/2016 231   06/08/2016 231   06/05/2016 66 (L)   06/04/2016 59 (L)   06/04/2016 40 (LL)   06/03/2016 39 (LL)   06/03/2016 3 (LL)       Platelet count: (Full CBC scanned into media)  11/14/2017 237  08/08/2017 225  05/08/2017 248  02/06/2017 242  01/24/2017 254  01/10/2017 278  12/27/2017 243  12/12/2017 247  11/28/2016 250  11/21/2016 250  11/14/2016 254 Prednisone stopped  11/07/2016 247 Pred dropped to 2.5 mg every other day  10/31/2016 210 Pred dropped to 2.5mg   10/27/2016 190 Pred dropped to 5mg  10/17/2016 157    10/10/2016 116 Pred dropped to 10mg 10/12  10/06/2016 159 Rituximab completed  09/29/2016 183 Pred dropped to 20mg   09/22/2016 176  09/15/2016 127 Rituximab weekly started  09/12/2016 129  09/06/2016 221  08/29/2016 269 Pred dropped to 40mg 8/30 08/23/2016 306  08/18/2016 89  08/16/2016 20 Pred increased to 60mg and IVIG x1  08/15/2016 5  07/20/2016 231  07/18/2016 274  Pred dropped to 10 mg 7/20 07/11/2016 269  07/05/2016 186  06/27/2016 146  06/20/2016 230   Pred dropped to 20 mg 6/22 06/14/2016 320 Pred dropped to 40 mg 6/15  06/08/2016 231  06/03/2016 3  Pred started at 60 mg daily and IVIG x1 dose    EGD 6/13/2016 by Dr. Elmore See:    H. Pylori negative   Esophagus:normal, early schatzki's ring that appeared widely patent   Stomach: Small size hiatal hernia, otherwise mucosa within normal.   Duodenum/jejunum: normal    Colonoscopy 6/13/2016 by Dr. Elmore See:    Terminal Ileum: normal   Cecum: normal   Ascending Colon: normal   Transverse Colon: normal   Descending Colon: normal   Sigmoid: normal   Rectum: normal, small internal hemorrhoids       Assessment:   1) ITP, remission  Diagnosed 6/3/2016, responded initially to IVIG and steroids, but relapsed when prednisone tapered to 5mg. Responded once again to IVIG and prednisone, but platelets fell while still on prednisone 40mg. S/p Rituximab weekly x 4 cycles and now in remission. She continues feeling well and platelets remain normal.  Continue to monitor every 3 months. 2) Vaccine schedule  As of 3/2017 she has completed the vaccine schedule for pneumoccal, Hflu, and meninogoccal vaccines, in case she needs a splenectomy in the future.     Plan:     · Labs every 3 months: CBC ([facility]-standing order)  · Return to clinic in about 3 months    Signed By: Won Agustin MD     November 16, 2017

## 2017-11-16 NOTE — MR AVS SNAPSHOT
Visit Information Date & Time Provider Department Dept. Phone Encounter #  
 11/16/2017 10:30 AM [facility]erv[facility] [address] [place] [address] [id number] Follow-up Instructions Return in about 3 months (around 2/16/2018) for SHANKAR Graham fu. Upcoming Health Maintenance Date Due DTaP/Tdap/Td series (1 - Tdap) 8/3/1965 BREAST CANCER SCRN MAMMOGRAM 8/3/1994 ZOSTER VACCINE AGE 60> 6/3/2004 GLAUCOMA SCREENING Q2Y 8/3/2009 OSTEOPOROSIS SCREENING (DEXA) 8/3/2009 MEDICARE YEARLY EXAM 8/3/2009 FOBT Q 1 YEAR AGE 50-75 6/3/2017 Influenza Age 5 to Adult 8/1/2017 Pneumococcal 65+ Low/Medium Risk (2 of 2 - PPSV23) 8/18/2017 Allergies as of 11/16/2017  Review Complete On: 11/16/2017 By: Eldon He LPN No Known Allergies Current Immunizations  Reviewed on 10/6/2016 Name Date Hib (PRP-T) 8/18/2016  1:49 PM  
 Meningococcal (MCV4O) Vaccine 8/18/2016  1:47 PM  
 Pneumococcal Conjugate (PCV-13) 8/18/2016  1:48 PM  
  
 Not reviewed this visit You Were Diagnosed With   
  
 Codes Comments Acute ITP (HCC)    -  Primary ICD-10-CM: J76.2 ICD-9-CM: 287.31 Vitals BP Pulse Temp Resp Height(growth percentile) Weight(growth percentile) 141/61 (BP 1 Location: Right arm, BP Patient Position: Sitting) 63 96 °F (35.6 °C) (Temporal) 20 5' 4\" (1.626 m) 145 lb (65.8 kg) SpO2 BMI OB Status Smoking Status 99% 24.89 kg/m2 Postmenopausal Never Smoker Vitals History BMI and BSA Data Body Mass Index Body Surface Area  
 24.89 kg/m 2 1.72 m 2 Preferred Pharmacy Pharmacy Name Phone [facility], [address] [facility] [phone] Your Updated Medication List  
  
Notice  As of 11/16/2017 11:07 AM  
 You have not been prescribed any medications. Follow-up Instructions Return in about 3 months (around 2/16/2018) for SHANKAR Graham.  
  
  
Introducing [facility] & HEALTH SERVICES! Derrell Reed introduces [employer] patient portal. Now you can access parts of your medical record, email your doctor's office, and request medication refills online. 1. In your internet browser, go to [email or web address] 2. Click on the First Time User? Click Here link in the Sign In box. You will see the New Member Sign Up page. 3. Enter your [employer] Access Code exactly as it appears below. You will not need to use this code after youve completed the sign-up process. If you do not sign up before the expiration date, you must request a new code. · [employer] Access Code: [email or web address] Expires: 2/14/2018 11:07 AM 
 
4. Enter the last four digits of your Social Security Number (xxxx) and Date of Birth (mm/dd/yyyy) as indicated and click Submit. You will be taken to the next sign-up page. 5. Create a K[employer] ID. This will be your [employer] login ID and cannot be changed, so think of one that is secure and easy to remember. 6. Create a [employer] password. You can change your password at any time. 7. Enter your Password Reset Question and Answer. This can be used at a later time if you forget your password. 8. Enter your e-mail address. You will receive e-mail notification when new information is available in [address] 9. Click Sign Up. You can now view and download portions of your medical record. 10. Click the Download Summary menu link to download a portable copy of your medical information. If you have questions, please visit the Frequently Asked Questions section of the [employer] website. Remember, [employer] is NOT to be used for urgent needs. For medical emergencies, dial 911. Now available from your iPhone and Android! Please provide this summary of care documentation to your next provider. Your primary care clinician is listed as Tana Carrera.  If you have any questions after today's visit, please call [phone].

## 2018-02-07 ENCOUNTER — TELEPHONE (OUTPATIENT)
Dept: ONCOLOGY | Age: 73
End: 2018-02-07

## 2018-02-07 NOTE — TELEPHONE ENCOUNTER
[address] at [place]  [phone]    02/07/18- Patient's sister, Amada Sharif, stated patient is suppose to have labs drawn prior to her follow up appointment on 2/22. [employer] [employer] told her that they needed a new lab order from us. Call placed to [facility] [place], informed them we had faxed a standing lab order to them in November. However, I did notice the stop date on the order was incorrect. Updated order faxed to [facility] [employer] [place] at [phone], confirmation received.

## 2018-02-12 ENCOUNTER — TELEPHONE (OUTPATIENT)
Dept: ONCOLOGY | Age: 73
End: 2018-02-12

## 2018-02-12 NOTE — TELEPHONE ENCOUNTER
[employer] at [facility]  [phone]    02/12/18- Phone call placed to pt to remind pt to have labs drawn prior to her follow up appointment with . Pt verbalized understanding.

## 2018-02-22 ENCOUNTER — OFFICE VISIT (OUTPATIENT)
Dept: ONCOLOGY | Age: 73
End: 2018-02-22

## 2018-02-22 VITALS
DIASTOLIC BLOOD PRESSURE: 78 MMHG | OXYGEN SATURATION: 99 % | WEIGHT: 147.2 LBS | TEMPERATURE: 97.9 F | SYSTOLIC BLOOD PRESSURE: 128 MMHG | BODY MASS INDEX: 25.13 KG/M2 | RESPIRATION RATE: 14 BRPM | HEART RATE: 62 BPM | HEIGHT: 64 IN

## 2018-02-22 DIAGNOSIS — D69.3 ACUTE ITP (HCC): Primary | ICD-10-CM

## 2018-02-22 NOTE — PROGRESS NOTES
Identified pt with two pt identifiers(name and ). Reviewed record in preparation for visit and have obtained necessary documentation. Chief Complaint   Patient presents with    Follow-up        Health Maintenance Due   Topic    DTaP/Tdap/Td series (1 - Tdap)    BREAST CANCER SCRN MAMMOGRAM     ZOSTER VACCINE AGE 60>     GLAUCOMA SCREENING Q2Y     OSTEOPOROSIS SCREENING (DEXA)     MEDICARE YEARLY EXAM     FOBT Q 1 YEAR AGE 54-65     Pneumococcal 65+ Low/Medium Risk (2 of 2 - PPSV23)     HM reviewed w/patient. Depression Screening:  No flowsheet data found. Learning Assessment:  No flowsheet data found. Abuse Screening:  No flowsheet data found. Fall Risk  No flowsheet data found. Coordination of Care Questionnaire:  :   1) Have you been to an emergency room, urgent care clinic since your last visit? no   Hospitalized since your last visit? no             2. Have seen or consulted any other health care provider since your last visit? No  If yes, where when, and reason for visit? 3) Do you have an Advanced Directive/ Living Will in place? No  If no, would you like information No    Patient is accompanied by sister I have received verbal consent from Martin Villanueva to discuss any/all medical information while they are present in the room.

## 2018-02-22 NOTE — PROGRESS NOTES
[facility] [place] [facility]  [address] [facility], [address] [address] Ernesto Ogden Tom: [phone]  F: [phone]      Reason for Visit:   Sisi Ivy is a 67 y.o. female who is seen for follow up of ITP. Treatment History:    · Diagnosed 6/3/2016  · IVIG 6/3/2016  · Prednisone from 6/3/2016 - approximately 11/15/2016  · IVIG 8/16/2016 - Relapsed once prednisone tapered to 5mg  · Rituximab weekly 9/15/2016 - 10/6/2016 for a total of 4 cycles     History of Present Illness:   She reports feeling well. No recent infections. No bleeding. No bruising. No recent infections. No fevers, chills, night sweats, unintentional weight loss, adenopathy. Sister present today. PAST HISTORY: The following sections were reviewed and updated in the EMR as appropriate: PMH, SH, FH, Medications, Allergies. No Known Allergies       Review of Systems: A complete review of systems was obtained, reviewed, and scanned into the EMR. Pertinent findings reviewed above. Physical Exam:     Visit Vitals    /78 (BP 1 Location: Left arm, BP Patient Position: Sitting)    Pulse 62    Temp 97.9 °F (36.6 °C) (Oral)    Resp 14    Ht 5' 4\" (1.626 m)    Wt 147 lb 3.2 oz (66.8 kg)    SpO2 99%    BMI 25.27 kg/m2     General: No distress  Eyes: PERRLA, anicteric sclerae  HENT: Atraumatic, OP clear  Neck: Supple  Lymphatic: No cervical, supraclavicular, or inguinal adenopathy  Respiratory: CTAB, normal respiratory effort  CV: Normal rate, regular rhythm, no murmurs, no peripheral edema  GI: Soft, nontender, nondistended, no masses, no hepatomegaly, no splenomegaly  MS: Normal gait and station. Digits without clubbing or cyanosis. Skin: No rashes, or petechiae. Normal temperature, turgor, and texture.     Psych: Alert, oriented, appropriate affect, normal judgment/insight    Results:     Lab Results   Component Value Date/Time    WBC 10.2 10/27/2016 09:49 AM    HGB 12.6 10/27/2016 09:49 AM HCT 39.7 10/27/2016 09:49 AM    PLATELET 188 06/55/9214 09:49 AM    MCV 77.7 (L) 10/27/2016 09:49 AM    ABS. NEUTROPHILS 6.1 10/06/2016 09:23 AM    Hemoglobin (POC) 14.3 10/06/2016 09:24 AM    Hematocrit (POC) 42 10/06/2016 09:24 AM     Lab Results   Component Value Date/Time    Sodium 138 08/17/2016 05:14 AM    Potassium 3.9 08/17/2016 05:14 AM    Chloride 104 08/17/2016 05:14 AM    CO2 27 08/17/2016 05:14 AM    Glucose 114 (H) 08/17/2016 05:14 AM    BUN 14 08/17/2016 05:14 AM    Creatinine 0.93 08/17/2016 05:14 AM    GFR est AA >60 08/17/2016 05:14 AM    GFR est non-AA 59 (L) 08/17/2016 05:14 AM    Calcium 9.4 08/17/2016 05:14 AM    Sodium (POC) 140 10/06/2016 09:24 AM    Potassium (POC) 3.5 10/06/2016 09:24 AM    Chloride (POC) 101 10/06/2016 09:24 AM    Glucose (POC) 83 10/06/2016 09:24 AM    BUN (POC) 17 10/06/2016 09:24 AM    Creatinine (POC) 1.0 10/06/2016 09:24 AM    Calcium, ionized (POC) 1.21 10/06/2016 09:24 AM     Lab Results   Component Value Date/Time    Bilirubin, total 0.6 08/17/2016 05:14 AM    ALT (SGPT) 20 08/17/2016 05:14 AM    AST (SGOT) 36 08/17/2016 05:14 AM    Alk. phosphatase 44 (L) 08/17/2016 05:14 AM    Protein, total 8.5 (H) 08/17/2016 05:14 AM    Albumin 3.2 (L) 08/17/2016 05:14 AM    Globulin 5.3 (H) 08/17/2016 05:14 AM     Lab Results   Component Value Date/Time    Reticulocyte count 0.8 06/03/2016 01:28 PM    Iron % saturation 16 (L) 06/04/2016 03:46 AM    TIBC 231 (L) 06/04/2016 03:46 AM    Ferritin 128 06/03/2016 01:20 PM    Vitamin B12 231 06/03/2016 01:20 PM    Folate 10.7 06/03/2016 01:20 PM    Homocysteine, plasma 8.1 06/04/2016 04:00 PM    Methylmalonic acid 97 06/04/2016 04:00 PM    Haptoglobin 68 06/04/2016 03:46 AM     06/03/2016 01:28 PM    Hep C  virus Ab Interp.  NONREACTIVE 06/04/2016 03:46 AM    HIV 1/2 Interpretation NONREACTIVE 06/03/2016 02:30 PM     Lab Results   Component Value Date/Time    INR 1.0 06/03/2016 01:28 PM    aPTT 33.5 (H) 06/03/2016 01:28 PM Fibrinogen 250 06/03/2016 03:15 PM     PLATELET (K/uL)   Date Value   10/27/2016 190   10/06/2016 159   09/29/2016 183   09/22/2016 176   09/15/2016 127 (L)   09/06/2016 221   08/23/2016 306   08/18/2016 89 (L)   08/17/2016 36 (LL)   08/16/2016 20 (LL)   07/29/2016 231   06/08/2016 231   06/05/2016 66 (L)   06/04/2016 59 (L)   06/04/2016 40 (LL)   06/03/2016 39 (LL)   06/03/2016 3 (LL)       Platelet count: (Full CBC scanned into media)  02/19/2018 279  11/14/2017 237  08/08/2017 225  05/08/2017 248  02/06/2017 242  01/24/2017 254  01/10/2017 278  12/27/2017 243  12/12/2017 247  11/28/2016 250  11/21/2016 250  11/14/2016 254 Prednisone stopped  11/07/2016 247 Pred dropped to 2.5 mg every other day  10/31/2016 210 Pred dropped to 2.5mg   10/27/2016 190 Pred dropped to 5mg  10/17/2016 157    10/10/2016 116 Pred dropped to 10mg 10/12  10/06/2016 159 Rituximab completed  09/29/2016 183 Pred dropped to 20mg   09/22/2016 176  09/15/2016 127 Rituximab weekly started  09/12/2016 129  09/06/2016 221  08/29/2016 269 Pred dropped to 40mg 8/30 08/23/2016 306  08/18/2016 89  08/16/2016 20 Pred increased to 60mg and IVIG x1  08/15/2016 5  07/20/2016 231  07/18/2016 274  Pred dropped to 10 mg 7/20 07/11/2016 269  07/05/2016 186  06/27/2016 146  06/20/2016 230   Pred dropped to 20 mg 6/22 06/14/2016 320 Pred dropped to 40 mg 6/15  06/08/2016 231  06/03/2016 3  Pred started at 60 mg daily and IVIG x1 dose    EGD 6/13/2016 by Dr. Mika Farmer:    H. Pylori negative   Esophagus:normal, early schatzki's ring that appeared widely patent   Stomach: Small size hiatal hernia, otherwise mucosa within normal.   Duodenum/jejunum: normal    Colonoscopy 6/13/2016 by Dr. Mika Farmer:    Terminal Ileum: normal   Cecum: normal   Ascending Colon: normal   Transverse Colon: normal   Descending Colon: normal   Sigmoid: normal   Rectum: normal, small internal hemorrhoids       Assessment:   1) ITP, remission  Diagnosed 6/3/2016, responded initially to IVIG and steroids, but relapsed when prednisone tapered to 5mg. Responded once again to IVIG and prednisone, but relapsed while still on prednisone 40mg. S/p Rituximab weekly x 4 cycles and now in remission. She continues feeling well and platelets remain normal.  Continue to monitor every 3 months. 2) Vaccine schedule  As of 3/2017 she has completed the vaccine schedule for pneumoccal, Hflu, and meninogoccal vaccines, in case she needs a splenectomy in the future.     Plan:     · Labs every 3-4 months: CBC ([facility]-standing order)  · Return to clinic in about 3-4 months    Signed By: Brenton Mo MD

## 2018-05-22 LAB
BASOPHILS # BLD AUTO: 0.1 X10E3/UL (ref 0–0.2)
BASOPHILS NFR BLD AUTO: 1 %
EOSINOPHIL # BLD AUTO: 0.1 X10E3/UL (ref 0–0.4)
EOSINOPHIL NFR BLD AUTO: 3 %
ERYTHROCYTE [DISTWIDTH] IN BLOOD BY AUTOMATED COUNT: 15.5 % (ref 12.3–15.4)
HCT VFR BLD AUTO: 37.1 % (ref 34–46.6)
HGB BLD-MCNC: 12.4 G/DL (ref 11.1–15.9)
IMM GRANULOCYTES # BLD: 0 X10E3/UL (ref 0–0.1)
IMM GRANULOCYTES NFR BLD: 0 %
LYMPHOCYTES # BLD AUTO: 2 X10E3/UL (ref 0.7–3.1)
LYMPHOCYTES NFR BLD AUTO: 47 %
MCH RBC QN AUTO: 25.7 PG (ref 26.6–33)
MCHC RBC AUTO-ENTMCNC: 33.4 G/DL (ref 31.5–35.7)
MCV RBC AUTO: 77 FL (ref 79–97)
MONOCYTES # BLD AUTO: 0.3 X10E3/UL (ref 0.1–0.9)
MONOCYTES NFR BLD AUTO: 6 %
NEUTROPHILS # BLD AUTO: 1.9 X10E3/UL (ref 1.4–7)
NEUTROPHILS NFR BLD AUTO: 43 %
PLATELET # BLD AUTO: 258 X10E3/UL (ref 150–379)
RBC # BLD AUTO: 4.83 X10E6/UL (ref 3.77–5.28)
WBC # BLD AUTO: 4.3 X10E3/UL (ref 3.4–10.8)

## 2018-05-23 ENCOUNTER — OFFICE VISIT (OUTPATIENT)
Dept: ONCOLOGY | Age: 73
End: 2018-05-23

## 2018-05-23 VITALS
SYSTOLIC BLOOD PRESSURE: 144 MMHG | BODY MASS INDEX: 25.95 KG/M2 | OXYGEN SATURATION: 99 % | HEART RATE: 58 BPM | WEIGHT: 152 LBS | HEIGHT: 64 IN | TEMPERATURE: 95.6 F | DIASTOLIC BLOOD PRESSURE: 68 MMHG | RESPIRATION RATE: 18 BRPM

## 2018-05-23 DIAGNOSIS — Z86.2 HISTORY OF ITP: Primary | ICD-10-CM

## 2018-05-23 NOTE — MR AVS SNAPSHOT
[address] 
 
 
 [address] [facility], [address] 
[phone] Patient: Chapin Taylor MRN: [id number] LDO:8/7/4073 Visit Information Date & Time Provider Department Dept. Phone Encounter #  
 5/23/2018  9:30 AM MD [facility] at [address] [place] [address] [id number] Follow-up Instructions Return in about 4 months (around 9/23/2018) for SHANKAR Graham fu.  
 Follow-up and Disposition History Your Appointments 9/26/2018 10:30 AM  
ESTABLISHED PATIENT with MD [facility] at [address] [place] [address] Appt Note: 4 mo fu, Santos [address] [facility], [address] [address]  
[phone]  
  
   
 [address] [facility], [facility] [address] Upcoming Health Maintenance Date Due DTaP/Tdap/Td series (1 - Tdap) 8/3/1965 BREAST CANCER SCRN MAMMOGRAM 8/3/1994 ZOSTER VACCINE AGE 60> 6/3/2004 GLAUCOMA SCREENING Q2Y 8/3/2009 Bone Densitometry (Dexa) Screening 8/3/2009 FOBT Q 1 YEAR AGE 50-75 6/3/2017 Pneumococcal 65+ Low/Medium Risk (2 of 2 - PPSV23) 8/18/2017 Influenza Age 5 to Adult 8/1/2018 Allergies as of 5/23/2018  Review Complete On: 5/23/2018 By: Veda Galicia MD  
 No Known Allergies Current Immunizations  Reviewed on 2/22/2018 Name Date Hib (PRP-T) 8/18/2016  1:49 PM  
 Meningococcal (MCV4O) Vaccine 8/18/2016  1:47 PM  
 Pneumococcal Conjugate (PCV-13) 8/18/2016  1:48 PM  
  
 Not reviewed this visit You Were Diagnosed With   
  
 Codes Comments History of ITP    -  Primary ICD-10-CM: Z86.2 ICD-9-CM: V12.3 Vitals BP Pulse Temp Resp Height(growth percentile) 144/68 (BP 1 Location: Left arm, BP Patient Position: Sitting) (!) 58 95.6 °F (35.3 °C) (Temporal) 18 5' 4\" (1.626 m) Weight(growth percentile) SpO2 BMI OB Status Smoking Status 152 lb (68.9 kg) 99% 26.09 kg/m2 Postmenopausal Never Smoker Vitals History BMI and BSA Data Body Mass Index Body Surface Area 26.09 kg/m 2 1.76 m 2 Your Updated Medication List  
  
Notice  As of 5/23/2018  9:56 AM  
 You have not been prescribed any medications. We Performed the Following CBC WITH AUTOMATED DIFF [82983 CPT(R)] Follow-up Instructions Return in about 4 months (around 9/23/2018) for SHANKAR Graham.  
  
  
Introducing [facility] & HEALTH SERVICES! [facility] introduces [employer] patient portal. Now you can access parts of your medical record, email your doctor's office, and request medication refills online. 1. In your internet browser, go to [email or web address] 2. Click on the First Time User? Click Here link in the Sign In box. You will see the New Member Sign Up page. 3. Enter your [employer] Access Code exactly as it appears below. You will not need to use this code after youve completed the sign-up process. If you do not sign up before the expiration date, you must request a new code. · [employer] Access Code: [email or web address] Expires: 8/21/2018  6:37 AM 
 
4. Enter the last four digits of your Social Security Number (xxxx) and Date of Birth (mm/dd/yyyy) as indicated and click Submit. You will be taken to the next sign-up page. 5. Create a [employer] ID. This will be your [employer] login ID and cannot be changed, so think of one that is secure and easy to remember. 6. Create a [employer] password. You can change your password at any time. 7. Enter your Password Reset Question and Answer. This can be used at a later time if you forget your password. 8. Enter your e-mail address. You will receive e-mail notification when new information is available in [address] 9. Click Sign Up. You can now view and download portions of your medical record. 10. Click the Download Summary menu link to download a portable copy of your medical information. If you have questions, please visit the Frequently Asked Questions section of the [employer] website. Remember, [employer] is NOT to be used for urgent needs. For medical emergencies, dial 911. Now available from your iPhone and Android! Please provide this summary of care documentation to your next provider. Your primary care clinician is listed as Osei Matias. If you have any questions after today's visit, please call [phone].

## 2018-05-23 NOTE — PROGRESS NOTES
[facility] [place] at [address] [place] [address]  [address] [facility], [address] [place] [address]  Rehana Resides: [phone]  F: [phone]      Reason for Visit:   Manolo Rojas is a 67 y.o. female who is seen for follow up of ITP. Treatment History:    · Diagnosed 6/3/2016  · IVIG 6/3/2016  · Prednisone from 6/3/2016 - approximately 11/15/2016  · IVIG 8/16/2016 - Relapsed once prednisone tapered to 5mg  · Rituximab weekly 9/15/2016 - 10/6/2016 for a total of 4 cycles     History of Present Illness:   She continues to do well. No recent medical issues. No recent infections. No bleeding or bruising. Sister present today. PAST HISTORY: The following sections were reviewed and updated in the EMR as appropriate: PMH, SH, FH, Medications, Allergies. No Known Allergies       Review of Systems: A complete review of systems was obtained, reviewed, and scanned into the EMR. Pertinent findings reviewed above. Physical Exam:     Visit Vitals    /68 (BP 1 Location: Left arm, BP Patient Position: Sitting)  Comment: .  Pulse (!) 58    Temp 95.6 °F (35.3 °C) (Temporal)    Resp 18    Ht 5' 4\" (1.626 m)    Wt 152 lb (68.9 kg)    SpO2 99%    BMI 26.09 kg/m2     General: No distress  Eyes: PERRLA, anicteric sclerae  HENT: Atraumatic, OP clear  Neck: Supple  Lymphatic: No cervical, supraclavicular, or inguinal adenopathy  Respiratory: CTAB, normal respiratory effort  CV: Normal rate, regular rhythm, no murmurs, no peripheral edema  GI: Soft, nontender, nondistended, no masses, no hepatomegaly, no splenomegaly  MS: Normal gait and station. Digits without clubbing or cyanosis. Skin: No rashes, or petechiae. Normal temperature, turgor, and texture.     Psych: Alert, oriented, appropriate affect, normal judgment/insight    Results:     Lab Results   Component Value Date/Time    WBC 4.3 05/21/2018 09:15 AM    HGB 12.4 05/21/2018 09:15 AM    HCT 37.1 05/21/2018 09:15 AM    PLATELET 961 74/62/2586 09:15 AM    MCV 77 (L) 05/21/2018 09:15 AM    ABS. NEUTROPHILS 1.9 05/21/2018 09:15 AM    Hemoglobin (POC) 14.3 10/06/2016 09:24 AM    Hematocrit (POC) 42 10/06/2016 09:24 AM     Lab Results   Component Value Date/Time    Sodium 138 08/17/2016 05:14 AM    Potassium 3.9 08/17/2016 05:14 AM    Chloride 104 08/17/2016 05:14 AM    CO2 27 08/17/2016 05:14 AM    Glucose 114 (H) 08/17/2016 05:14 AM    BUN 14 08/17/2016 05:14 AM    Creatinine 0.93 08/17/2016 05:14 AM    GFR est AA >60 08/17/2016 05:14 AM    GFR est non-AA 59 (L) 08/17/2016 05:14 AM    Calcium 9.4 08/17/2016 05:14 AM    Sodium (POC) 140 10/06/2016 09:24 AM    Potassium (POC) 3.5 10/06/2016 09:24 AM    Chloride (POC) 101 10/06/2016 09:24 AM    Glucose (POC) 83 10/06/2016 09:24 AM    BUN (POC) 17 10/06/2016 09:24 AM    Creatinine (POC) 1.0 10/06/2016 09:24 AM    Calcium, ionized (POC) 1.21 10/06/2016 09:24 AM     Lab Results   Component Value Date/Time    Bilirubin, total 0.6 08/17/2016 05:14 AM    ALT (SGPT) 20 08/17/2016 05:14 AM    AST (SGOT) 36 08/17/2016 05:14 AM    Alk. phosphatase 44 (L) 08/17/2016 05:14 AM    Protein, total 8.5 (H) 08/17/2016 05:14 AM    Albumin 3.2 (L) 08/17/2016 05:14 AM    Globulin 5.3 (H) 08/17/2016 05:14 AM     Lab Results   Component Value Date/Time    Reticulocyte count 0.8 06/03/2016 01:28 PM    Iron % saturation 16 (L) 06/04/2016 03:46 AM    TIBC 231 (L) 06/04/2016 03:46 AM    Ferritin 128 06/03/2016 01:20 PM    Vitamin B12 231 06/03/2016 01:20 PM    Folate 10.7 06/03/2016 01:20 PM    Homocysteine, plasma 8.1 06/04/2016 04:00 PM    Methylmalonic acid 97 06/04/2016 04:00 PM    Haptoglobin 68 06/04/2016 03:46 AM     06/03/2016 01:28 PM    Hep C  virus Ab Interp.  NONREACTIVE 06/04/2016 03:46 AM    HIV 1/2 Interpretation NONREACTIVE 06/03/2016 02:30 PM     Lab Results   Component Value Date/Time    INR 1.0 06/03/2016 01:28 PM    aPTT 33.5 (H) 06/03/2016 01:28 PM    Fibrinogen 250 06/03/2016 03:15 PM     PLATELET   Date Value 05/21/2018 258 x10E3/uL   10/27/2016 190 K/uL   10/06/2016 159 K/uL   09/29/2016 183 K/uL   09/22/2016 176 K/uL   09/15/2016 127 K/uL (L)   09/06/2016 221 K/uL   08/23/2016 306 K/uL   08/18/2016 89 K/uL (L)   08/17/2016 36 K/uL (LL)   08/16/2016 20 K/uL (LL)   07/29/2016 231 K/uL   06/08/2016 231 K/uL   06/05/2016 66 K/uL (L)   06/04/2016 59 K/uL (L)   06/04/2016 40 K/uL (LL)   06/03/2016 39 K/uL (LL)   06/03/2016 3 K/uL (LL)       Platelet count: (Full CBC scanned into media)  02/19/2018 279  11/14/2017 237  08/08/2017 225  05/08/2017 248  02/06/2017 242  01/24/2017 254  01/10/2017 278  12/27/2017 243  12/12/2017 247  11/28/2016 250  11/21/2016 250  11/14/2016 254 Prednisone stopped  11/07/2016 247 Pred dropped to 2.5 mg every other day  10/31/2016 210 Pred dropped to 2.5mg   10/27/2016 190 Pred dropped to 5mg  10/17/2016 157    10/10/2016 116 Pred dropped to 10mg 10/12  10/06/2016 159 Rituximab completed  09/29/2016 183 Pred dropped to 20mg   09/22/2016 176  09/15/2016 127 Rituximab weekly started  09/12/2016 129  09/06/2016 221  08/29/2016 269 Pred dropped to 40mg 8/30 08/23/2016 306  08/18/2016 89  08/16/2016 20 Pred increased to 60mg and IVIG x1  08/15/2016 5  07/20/2016 231  07/18/2016 274  Pred dropped to 10 mg 7/20 07/11/2016 269  07/05/2016 186  06/27/2016 146  06/20/2016 230   Pred dropped to 20 mg 6/22 06/14/2016 320 Pred dropped to 40 mg 6/15  06/08/2016 231  06/03/2016 3  Pred started at 60 mg daily and IVIG x1 dose    EGD 6/13/2016 by Dr. Carol Kaiser:    H. Pylori negative   Esophagus:normal, early schatzki's ring that appeared widely patent   Stomach: Small size hiatal hernia, otherwise mucosa within normal.   Duodenum/jejunum: normal    Colonoscopy 6/13/2016 by Dr. Carol Kaiser:    Terminal Ileum: normal   Cecum: normal   Ascending Colon: normal   Transverse Colon: normal   Descending Colon: normal   Sigmoid: normal   Rectum: normal, small internal hemorrhoids       Assessment:   1) ITP, remission  Diagnosed 6/3/2016, responded initially to IVIG and steroids, but relapsed when prednisone tapered to 5mg. Responded once again to IVIG and prednisone, but relapsed while still on prednisone 40mg. S/p Rituximab weekly x 4 cycles completed 10/2016, achieving remission. Platelet count remains normal, she remains in remission. She is at risk of relapse, so we will continue to monitor, but reduce the frequency of checks to every 4 months. 2) Vaccine schedule  As of 3/2017 she has completed the vaccine schedule for pneumoccal, Hflu, and meninogoccal vaccines, in case she needs a splenectomy in the future.     Plan:     · Labs in 4 months: CBC  · Return to clinic in about 4 months    Signed By: Louise De La Fuente MD

## 2018-09-21 ENCOUNTER — TELEPHONE (OUTPATIENT)
Dept: ONCOLOGY | Age: 73
End: 2018-09-21

## 2018-09-21 NOTE — TELEPHONE ENCOUNTER
[address] Maye Ordonez at [facility]  [phone]    09/21/18- Phone call placed to pt to remind pt to have labs drawn prior to her follow up appointment with . Patient verbalized understanding.

## 2018-09-25 LAB
BASOPHILS # BLD AUTO: 0.1 X10E3/UL (ref 0–0.2)
BASOPHILS NFR BLD AUTO: 2 %
EOSINOPHIL # BLD AUTO: 0.1 X10E3/UL (ref 0–0.4)
EOSINOPHIL NFR BLD AUTO: 3 %
ERYTHROCYTE [DISTWIDTH] IN BLOOD BY AUTOMATED COUNT: 15.2 % (ref 12.3–15.4)
HCT VFR BLD AUTO: 38.8 % (ref 34–46.6)
HGB BLD-MCNC: 12.6 G/DL (ref 11.1–15.9)
IMM GRANULOCYTES # BLD: 0 X10E3/UL (ref 0–0.1)
IMM GRANULOCYTES NFR BLD: 0 %
LYMPHOCYTES # BLD AUTO: 2.1 X10E3/UL (ref 0.7–3.1)
LYMPHOCYTES NFR BLD AUTO: 47 %
MCH RBC QN AUTO: 25.9 PG (ref 26.6–33)
MCHC RBC AUTO-ENTMCNC: 32.5 G/DL (ref 31.5–35.7)
MCV RBC AUTO: 80 FL (ref 79–97)
MONOCYTES # BLD AUTO: 0.2 X10E3/UL (ref 0.1–0.9)
MONOCYTES NFR BLD AUTO: 5 %
NEUTROPHILS # BLD AUTO: 1.9 X10E3/UL (ref 1.4–7)
NEUTROPHILS NFR BLD AUTO: 43 %
PLATELET # BLD AUTO: 262 X10E3/UL (ref 150–379)
RBC # BLD AUTO: 4.87 X10E6/UL (ref 3.77–5.28)
WBC # BLD AUTO: 4.4 X10E3/UL (ref 3.4–10.8)

## 2018-09-26 ENCOUNTER — OFFICE VISIT (OUTPATIENT)
Dept: ONCOLOGY | Age: 73
End: 2018-09-26

## 2018-09-26 VITALS
BODY MASS INDEX: 26.29 KG/M2 | HEART RATE: 58 BPM | TEMPERATURE: 97.2 F | SYSTOLIC BLOOD PRESSURE: 132 MMHG | OXYGEN SATURATION: 98 % | HEIGHT: 64 IN | RESPIRATION RATE: 16 BRPM | DIASTOLIC BLOOD PRESSURE: 63 MMHG | WEIGHT: 154 LBS

## 2018-09-26 DIAGNOSIS — Z86.2 HISTORY OF ITP: Primary | ICD-10-CM

## 2018-09-26 NOTE — MR AVS SNAPSHOT
[address] [facility] 
 
 
 [address] [facility], [address] [place] [address] 
[phone] Patient: Lety Hoffman MRN: [id number] DFM:0/7/3853 Visit Information Date & Time Provider Department Dept. Phone Encounter #  
 9/26/2018 10:30 AM MD Patti Balderas Dr at [facility] [phone] [id number] Follow-up Instructions Return in about 4 months (around 1/26/2019) for SHANKAR Graham fu.  
 Follow-up and Disposition History Your Appointments 1/24/2019 10:30 AM  
ESTABLISHED PATIENT with MD Patti Balderas Dr at [facility] [address]) Appt Note: SHANKAR Graham fu.  
 [address] [place] [address], [address] [facility] [address] [address]  
[phone]  
  
   
 [address] [place] [address], [address] [place] [address] Upcoming Health Maintenance Date Due DTaP/Tdap/Td series (1 - Tdap) 8/3/1965 Shingrix Vaccine Age 50> (1 of 2) 8/3/1994 BREAST CANCER SCRN MAMMOGRAM 8/3/1994 GLAUCOMA SCREENING Q2Y 8/3/2009 Bone Densitometry (Dexa) Screening 8/3/2009 FOBT Q 1 YEAR AGE 50-75 6/3/2017 Pneumococcal 65+ Low/Medium Risk (2 of 2 - PPSV23) 8/18/2017 Influenza Age 5 to Adult 8/1/2018 Allergies as of 9/26/2018  Review Complete On: 9/26/2018 By: Meeta Paniagua MD  
 No Known Allergies Current Immunizations  Reviewed on 2/22/2018 Name Date Hib (PRP-T) 8/18/2016  1:49 PM  
 Meningococcal (MCV4O) Vaccine 8/18/2016  1:47 PM  
 Pneumococcal Conjugate (PCV-13) 8/18/2016  1:48 PM  
  
 Not reviewed this visit You Were Diagnosed With   
  
 Codes Comments History of ITP    -  Primary ICD-10-CM: Z86.2 ICD-9-CM: V12.3 Vitals BP Pulse Temp Resp Height(growth percentile) Weight(growth percentile) 132/63 (BP 1 Location: Left arm, BP Patient Position: Sitting) (!) 58 97.2 °F (36.2 °C) (Oral) 16 5' 4\" (1.626 m) 154 lb (69.9 kg) SpO2 BMI OB Status Smoking Status 98% 26.43 kg/m2 Postmenopausal Never Smoker Vitals History BMI and BSA Data Body Mass Index Body Surface Area  
 26.43 kg/m 2 1.78 m 2 Preferred Pharmacy Pharmacy Name Phone [facility] [phone] Your Updated Medication List  
  
Notice  As of 9/26/2018 10:53 AM  
 You have not been prescribed any medications. We Performed the Following CBC WITH AUTOMATED DIFF [47543 CPT(R)] Follow-up Instructions Return in about 4 months (around 1/26/2019) for SHANKAR Graham.  
  
  
Introducing [facility] & HEALTH SERVICES! [facility] introduces [employer] patient portal. Now you can access parts of your medical record, email your doctor's office, and request medication refills online. 1. In your internet browser, go to [email or web address] 2. Click on the First Time User? Click Here link in the Sign In box. You will see the New Member Sign Up page. 3. Enter your [employer] Access Code exactly as it appears below. You will not need to use this code after youve completed the sign-up process. If you do not sign up before the expiration date, you must request a new code. · [employer] Access Code: [email or web address] Expires: 12/25/2018  7:09 AM 
 
4. Enter the last four digits of your Social Security Number (xxxx) and Date of Birth (mm/dd/yyyy) as indicated and click Submit. You will be taken to the next sign-up page. 5. Create a [employer] ID. This will be your [employer] login ID and cannot be changed, so think of one that is secure and easy to remember. 6. Create a [employer] password. You can change your password at any time. 7. Enter your Password Reset Question and Answer. This can be used at a later time if you forget your password. 8. Enter your e-mail address. You will receive e-mail notification when new information is available in [address] 9. Click Sign Up. You can now view and download portions of your medical record. 10. Click the Download Summary menu link to download a portable copy of your medical information. If you have questions, please visit the Frequently Asked Questions section of the [employer] website. Remember, [employer] is NOT to be used for urgent needs. For medical emergencies, dial 911. Now available from your iPhone and Android! Please provide this summary of care documentation to your next provider. Your primary care clinician is listed as Dee Dee Levy. If you have any questions after today's visit, please call [phone].

## 2018-09-26 NOTE — PROGRESS NOTES
[facility] [place] at [address], [address]  Jose Hoop: [phone]  F: [phone]      Reason for Visit:   Jamaica Carter is a 75 y.o. female who is seen for follow up of ITP. Treatment History:    · Diagnosed 6/3/2016  · IVIG 6/3/2016  · Prednisone from 6/3/2016 - approximately 11/15/2016  · IVIG 8/16/2016 - Relapsed once prednisone tapered to 5mg  · Rituximab weekly 9/15/2016 - 10/6/2016 for a total of 4 cycles     History of Present Illness:   She continues to feel well. No fevers, chills, night sweats, unintentional weight loss, adenopathy. No bleeding or bruising. \"Lots of energy. \"  No recent infections. Sister present today. PAST HISTORY: The following sections were reviewed and updated in the EMR as appropriate: PMH, SH, FH, Medications, Allergies. No Known Allergies       Review of Systems: A complete review of systems was obtained, reviewed, and scanned into the EMR. Pertinent findings reviewed above. Physical Exam:     Visit Vitals    /63 (BP 1 Location: Left arm, BP Patient Position: Sitting)    Pulse (!) 58    Temp 97.2 °F (36.2 °C) (Oral)    Resp 16    Ht 5' 4\" (1.626 m)    Wt 154 lb (69.9 kg)    SpO2 98%    BMI 26.43 kg/m2     General: No distress  Eyes: PERRLA, anicteric sclerae  HENT: Atraumatic, OP clear  Neck: Supple  Lymphatic: No cervical, supraclavicular, or inguinal adenopathy  Respiratory: CTAB, normal respiratory effort  CV: Normal rate, regular rhythm, no murmurs, no peripheral edema  GI: Soft, nontender, nondistended, no masses, no hepatomegaly, no splenomegaly  MS: Normal gait and station. Digits without clubbing or cyanosis. Skin: No rashes, or petechiae. Normal temperature, turgor, and texture.     Psych: Alert, oriented, appropriate affect, normal judgment/insight    Results:     Lab Results   Component Value Date/Time    WBC 4.4 09/24/2018 10:07 AM    HGB 12.6 09/24/2018 10:07 AM    HCT 38.8 09/24/2018 10:07 AM    PLATELET 370 79/98/4434 10:07 AM    MCV 80 09/24/2018 10:07 AM    ABS. NEUTROPHILS 1.9 09/24/2018 10:07 AM    Hemoglobin (POC) 14.3 10/06/2016 09:24 AM    Hematocrit (POC) 42 10/06/2016 09:24 AM     Lab Results   Component Value Date/Time    Sodium 138 08/17/2016 05:14 AM    Potassium 3.9 08/17/2016 05:14 AM    Chloride 104 08/17/2016 05:14 AM    CO2 27 08/17/2016 05:14 AM    Glucose 114 (H) 08/17/2016 05:14 AM    BUN 14 08/17/2016 05:14 AM    Creatinine 0.93 08/17/2016 05:14 AM    GFR est AA >60 08/17/2016 05:14 AM    GFR est non-AA 59 (L) 08/17/2016 05:14 AM    Calcium 9.4 08/17/2016 05:14 AM    Sodium (POC) 140 10/06/2016 09:24 AM    Potassium (POC) 3.5 10/06/2016 09:24 AM    Chloride (POC) 101 10/06/2016 09:24 AM    Glucose (POC) 83 10/06/2016 09:24 AM    BUN (POC) 17 10/06/2016 09:24 AM    Creatinine (POC) 1.0 10/06/2016 09:24 AM    Calcium, ionized (POC) 1.21 10/06/2016 09:24 AM     Lab Results   Component Value Date/Time    Bilirubin, total 0.6 08/17/2016 05:14 AM    ALT (SGPT) 20 08/17/2016 05:14 AM    AST (SGOT) 36 08/17/2016 05:14 AM    Alk. phosphatase 44 (L) 08/17/2016 05:14 AM    Protein, total 8.5 (H) 08/17/2016 05:14 AM    Albumin 3.2 (L) 08/17/2016 05:14 AM    Globulin 5.3 (H) 08/17/2016 05:14 AM     Lab Results   Component Value Date/Time    Reticulocyte count 0.8 06/03/2016 01:28 PM    Iron % saturation 16 (L) 06/04/2016 03:46 AM    TIBC 231 (L) 06/04/2016 03:46 AM    Ferritin 128 06/03/2016 01:20 PM    Vitamin B12 231 06/03/2016 01:20 PM    Folate 10.7 06/03/2016 01:20 PM    Homocysteine, plasma 8.1 06/04/2016 04:00 PM    Methylmalonic acid 97 06/04/2016 04:00 PM    Haptoglobin 68 06/04/2016 03:46 AM     06/03/2016 01:28 PM    Hep C  virus Ab Interp.  NONREACTIVE 06/04/2016 03:46 AM    HIV 1/2 Interpretation NONREACTIVE 06/03/2016 02:30 PM     Lab Results   Component Value Date/Time    INR 1.0 06/03/2016 01:28 PM    aPTT 33.5 (H) 06/03/2016 01:28 PM    Fibrinogen 250 06/03/2016 03:15 PM     PLATELET   Date Value   09/24/2018 262 x10E3/uL   05/21/2018 258 x10E3/uL   10/27/2016 190 K/uL   10/06/2016 159 K/uL   09/29/2016 183 K/uL   09/22/2016 176 K/uL   09/15/2016 127 K/uL (L)   09/06/2016 221 K/uL   08/23/2016 306 K/uL   08/18/2016 89 K/uL (L)   08/17/2016 36 K/uL (LL)   08/16/2016 20 K/uL (LL)   07/29/2016 231 K/uL   06/08/2016 231 K/uL   06/05/2016 66 K/uL (L)   06/04/2016 59 K/uL (L)   06/04/2016 40 K/uL (LL)   06/03/2016 39 K/uL (LL)   06/03/2016 3 K/uL (LL)       Platelet count: (Full CBC scanned into media)  02/19/2018 279  11/14/2017 237  08/08/2017 225  05/08/2017 248  02/06/2017 242  01/24/2017 254  01/10/2017 278  12/27/2017 243  12/12/2017 247  11/28/2016 250  11/21/2016 250  11/14/2016 254 Prednisone stopped  11/07/2016 247 Pred dropped to 2.5 mg every other day  10/31/2016 210 Pred dropped to 2.5mg   10/27/2016 190 Pred dropped to 5mg  10/17/2016 157    10/10/2016 116 Pred dropped to 10mg 10/12  10/06/2016 159 Rituximab completed  09/29/2016 183 Pred dropped to 20mg   09/22/2016 176  09/15/2016 127 Rituximab weekly started  09/12/2016 129  09/06/2016 221  08/29/2016 269 Pred dropped to 40mg 8/30 08/23/2016 306  08/18/2016 89  08/16/2016 20 Pred increased to 60mg and IVIG x1  08/15/2016 5  07/20/2016 231  07/18/2016 274  Pred dropped to 10 mg 7/20 07/11/2016 269  07/05/2016 186  06/27/2016 146  06/20/2016 230   Pred dropped to 20 mg 6/22 06/14/2016 320 Pred dropped to 40 mg 6/15  06/08/2016 231  06/03/2016 3  Pred started at 60 mg daily and IVIG x1 dose    EGD 6/13/2016 by Dr. José Antonio Villela:    H. Pylori negative   Esophagus:normal, early schatzki's ring that appeared widely patent   Stomach: Small size hiatal hernia, otherwise mucosa within normal.   Duodenum/jejunum: normal    Colonoscopy 6/13/2016 by Dr. José Antonio Villela:    Terminal Ileum: normal   Cecum: normal   Ascending Colon: normal   Transverse Colon: normal   Descending Colon: normal   Sigmoid: normal   Rectum: normal, small internal hemorrhoids       Assessment:   1) ITP, remission  Diagnosed 6/3/2016, responded initially to IVIG and steroids, but relapsed when prednisone tapered to 5mg. Responded once again to IVIG and prednisone, but relapsed while still on prednisone 40mg. S/p Rituximab weekly x 4 cycles completed 10/2016, achieving remission. Platelet count remains normal, she remains in remission. She is at risk of relapse, so we will continue to monitor every 4 months for now. 2) Vaccine schedule  As of 3/2017 she has completed the vaccine schedule for pneumoccal, Hflu, and meninogoccal vaccines, in case she needs a splenectomy in the future. 3) Cancer screening  Never had a mammogram, I recommended she have this done, she will think about it. Last colonoscopy 6/2016 with Dr. Osei Rodriguez, repeat due 2026.     Plan:     · Labs in 4 months: CBC  · Return to clinic in about 4 months    Signed By: Barney Barker MD

## 2019-01-14 ENCOUNTER — TELEPHONE (OUTPATIENT)
Dept: ONCOLOGY | Age: 74
End: 2019-01-14

## 2019-01-14 NOTE — TELEPHONE ENCOUNTER
[address]z at [place]  [phone]    01/14/19- Phone call placed to pt to remind pt to have labs drawn prior to her follow up appointment with . Pt verbalized understanding.

## 2019-01-23 LAB
BASOPHILS # BLD AUTO: 0.1 X10E3/UL (ref 0–0.2)
BASOPHILS NFR BLD AUTO: 1 %
EOSINOPHIL # BLD AUTO: 0.1 X10E3/UL (ref 0–0.4)
EOSINOPHIL NFR BLD AUTO: 2 %
ERYTHROCYTE [DISTWIDTH] IN BLOOD BY AUTOMATED COUNT: 14.7 % (ref 12.3–15.4)
HCT VFR BLD AUTO: 37 % (ref 34–46.6)
HGB BLD-MCNC: 12.3 G/DL (ref 11.1–15.9)
IMM GRANULOCYTES # BLD AUTO: 0 X10E3/UL (ref 0–0.1)
IMM GRANULOCYTES NFR BLD AUTO: 0 %
LYMPHOCYTES # BLD AUTO: 2.1 X10E3/UL (ref 0.7–3.1)
LYMPHOCYTES NFR BLD AUTO: 44 %
MCH RBC QN AUTO: 25.8 PG (ref 26.6–33)
MCHC RBC AUTO-ENTMCNC: 33.2 G/DL (ref 31.5–35.7)
MCV RBC AUTO: 78 FL (ref 79–97)
MONOCYTES # BLD AUTO: 0.3 X10E3/UL (ref 0.1–0.9)
MONOCYTES NFR BLD AUTO: 6 %
NEUTROPHILS # BLD AUTO: 2.3 X10E3/UL (ref 1.4–7)
NEUTROPHILS NFR BLD AUTO: 47 %
PLATELET # BLD AUTO: 243 X10E3/UL (ref 150–379)
RBC # BLD AUTO: 4.76 X10E6/UL (ref 3.77–5.28)
WBC # BLD AUTO: 4.8 X10E3/UL (ref 3.4–10.8)

## 2019-01-24 ENCOUNTER — OFFICE VISIT (OUTPATIENT)
Dept: ONCOLOGY | Age: 74
End: 2019-01-24

## 2019-01-24 VITALS
BODY MASS INDEX: 26.55 KG/M2 | DIASTOLIC BLOOD PRESSURE: 55 MMHG | SYSTOLIC BLOOD PRESSURE: 134 MMHG | HEIGHT: 64 IN | RESPIRATION RATE: 14 BRPM | HEART RATE: 62 BPM | TEMPERATURE: 96.9 F | OXYGEN SATURATION: 96 % | WEIGHT: 155.5 LBS

## 2019-01-24 DIAGNOSIS — Z86.2 HISTORY OF ITP: Primary | ICD-10-CM

## 2019-01-24 NOTE — PROGRESS NOTES
[facility] [place] at [address], [address] [place] [address] W: [phone]  F: [phone] Reason for Visit:  
Nay Ramsay is a 75 y.o. female who is seen for follow up of ITP. Treatment History: · Diagnosed 6/3/2016 · IVIG 6/3/2016 · Prednisone from 6/3/2016 - approximately 11/15/2016 · IVIG 8/16/2016 - Relapsed once prednisone tapered to 5mg · Rituximab weekly 9/15/2016 - 10/6/2016 for a total of 4 cycles History of Present Illness: She reports feeling well. No bleeding. No recent infections. No fevers, chills, night sweats, unintentional weight loss, adenopathy. Energy is good. Sister present today. PAST HISTORY: The following sections were reviewed and updated in the EMR as appropriate: PMH, SH, FH, Medications, Allergies. No Known Allergies Review of Systems: A complete review of systems was obtained, reviewed, and scanned into the EMR. Pertinent findings reviewed above. Physical Exam:  
 
Visit Vitals /55 (BP 1 Location: Left arm, BP Patient Position: Sitting) Comment: . Pulse 62 Temp 96.9 °F (36.1 °C) (Temporal) Resp 14 Ht 5' 4\" (1.626 m) Wt 155 lb 8 oz (70.5 kg) SpO2 96% BMI 26.69 kg/m² General: No distress Eyes: PERRLA, anicteric sclerae HENT: Atraumatic, OP clear Neck: Supple Lymphatic: No cervical, supraclavicular, or inguinal adenopathy Respiratory: CTAB, normal respiratory effort CV: Normal rate, regular rhythm, no murmurs, no peripheral edema GI: Soft, nontender, nondistended, no masses, no hepatomegaly, no splenomegaly MS: Normal gait and station. Digits without clubbing or cyanosis. Skin: No rashes, or petechiae. Normal temperature, turgor, and texture. Psych: Alert, oriented, appropriate affect, normal judgment/insight Results:  
 
Lab Results Component Value Date/Time  WBC 4.8 01/22/2019 10:44 AM  
 HGB 12.3 01/22/2019 10:44 AM  
 HCT 37.0 01/22/2019 10:44 AM  
 PLATELET 006 82/70/5315 10:44 AM  
 MCV 78 (L) 01/22/2019 10:44 AM  
 ABS. NEUTROPHILS 2.3 01/22/2019 10:44 AM  
 Hemoglobin (POC) 14.3 10/06/2016 09:24 AM  
 Hematocrit (POC) 42 10/06/2016 09:24 AM  
 
Lab Results Component Value Date/Time Sodium 138 08/17/2016 05:14 AM  
 Potassium 3.9 08/17/2016 05:14 AM  
 Chloride 104 08/17/2016 05:14 AM  
 CO2 27 08/17/2016 05:14 AM  
 Glucose 114 (H) 08/17/2016 05:14 AM  
 BUN 14 08/17/2016 05:14 AM  
 Creatinine 0.93 08/17/2016 05:14 AM  
 GFR est AA >60 08/17/2016 05:14 AM  
 GFR est non-AA 59 (L) 08/17/2016 05:14 AM  
 Calcium 9.4 08/17/2016 05:14 AM  
 Sodium (POC) 140 10/06/2016 09:24 AM  
 Potassium (POC) 3.5 10/06/2016 09:24 AM  
 Chloride (POC) 101 10/06/2016 09:24 AM  
 Glucose (POC) 83 10/06/2016 09:24 AM  
 BUN (POC) 17 10/06/2016 09:24 AM  
 Creatinine (POC) 1.0 10/06/2016 09:24 AM  
 Calcium, ionized (POC) 1.21 10/06/2016 09:24 AM  
 
Lab Results Component Value Date/Time Bilirubin, total 0.6 08/17/2016 05:14 AM  
 ALT (SGPT) 20 08/17/2016 05:14 AM  
 AST (SGOT) 36 08/17/2016 05:14 AM  
 Alk. phosphatase 44 (L) 08/17/2016 05:14 AM  
 Protein, total 8.5 (H) 08/17/2016 05:14 AM  
 Albumin 3.2 (L) 08/17/2016 05:14 AM  
 Globulin 5.3 (H) 08/17/2016 05:14 AM  
 
Lab Results Component Value Date/Time Reticulocyte count 0.8 06/03/2016 01:28 PM  
 Iron % saturation 16 (L) 06/04/2016 03:46 AM  
 TIBC 231 (L) 06/04/2016 03:46 AM  
 Ferritin 128 06/03/2016 01:20 PM  
 Vitamin B12 231 06/03/2016 01:20 PM  
 Folate 10.7 06/03/2016 01:20 PM  
 Homocysteine, plasma 8.1 06/04/2016 04:00 PM  
 Methylmalonic acid 97 06/04/2016 04:00 PM  
 Haptoglobin 68 06/04/2016 03:46 AM  
  06/03/2016 01:28 PM  
 Hep C  virus Ab Interp. NONREACTIVE 06/04/2016 03:46 AM  
 HIV 1/2 Interpretation NONREACTIVE 06/03/2016 02:30 PM  
 
Lab Results Component Value Date/Time  INR 1.0 06/03/2016 01:28 PM  
 aPTT 33.5 (H) 06/03/2016 01:28 PM  
 Fibrinogen 250 06/03/2016 03:15 PM  
 
PLATELET Date Value 01/22/2019 243 x10E3/uL  
09/24/2018 262 x10E3/uL  
05/21/2018 258 x10E3/uL  
10/27/2016 190 K/uL  
10/06/2016 159 K/uL  
09/29/2016 183 K/uL  
09/22/2016 176 K/uL  
09/15/2016 127 K/uL (L)  
09/06/2016 221 K/uL  
08/23/2016 306 K/uL  
08/18/2016 89 K/uL (L)  
08/17/2016 36 K/uL (LL)  
08/16/2016 20 K/uL (LL)  
07/29/2016 231 K/uL  
06/08/2016 231 K/uL  
06/05/2016 66 K/uL (L)  
06/04/2016 59 K/uL (L)  
06/04/2016 40 K/uL (LL)  
06/03/2016 39 K/uL (LL)  
06/03/2016 3 K/uL (LL) Platelet count: (Full CBC scanned into media) 02/19/2018 279 
11/14/2017 237 
08/08/2017 225 
05/08/2017 248 
02/06/2017 242 
01/24/2017 254 
01/10/2017 278 
12/27/2017 243 
12/12/2017 247 
11/28/2016 250 
11/21/2016 250 
11/14/2016 254 Prednisone stopped 11/07/2016 247 Pred dropped to 2.5 mg every other day 10/31/2016 210 Pred dropped to 2.5mg  
10/27/2016 190 Pred dropped to 5mg 10/17/2016 157   
10/10/2016 116 Pred dropped to 10mg 10/12 
10/06/2016 159 Rituximab completed 09/29/2016 183 Pred dropped to 20mg 09/22/2016 176 
09/15/2016 127 Rituximab weekly started 09/12/2016 129 
09/06/2016 221 
08/29/2016 269 Pred dropped to 40mg 8/30 08/23/2016 306 
08/18/2016 89 
08/16/2016 20 Pred increased to 60mg and IVIG x1 
08/15/2016 5 
07/20/2016 231 
07/18/2016 274  Pred dropped to 10 mg 7/20 07/11/2016 269 
07/05/2016 186 
06/27/2016 146 
06/20/2016 230   Pred dropped to 20 mg 6/22 06/14/2016 320 Pred dropped to 40 mg 6/15 
06/08/2016 231 
06/03/2016 3  Pred started at 60 mg daily and IVIG x1 dose EGD 6/13/2016 by Dr. Tammy Manriquez:  
 H. Pylori negative Esophagus:normal, early schatzki's ring that appeared widely patent  Stomach: Small size hiatal hernia, otherwise mucosa within normal. 
 Duodenum/jejunum: normal 
 
Colonoscopy 6/13/2016 by Dr. Tammy Manriquez:  
 Terminal Ileum: normal 
 Cecum: normal 
 Ascending Colon: normal 
 Transverse Colon: normal 
 Descending Colon: normal 
 Sigmoid: normal 
 Rectum: normal, small internal hemorrhoids Assessment:  
1) ITP, remission Diagnosed 6/3/2016, responded initially to IVIG and steroids, but relapsed when prednisone tapered to 5mg. Responded once again to IVIG and prednisone, but relapsed while still on prednisone 40mg. S/p Rituximab weekly x 4 cycles completed 10/2016, achieving remission. Platelet count remains normal, she remains in remission. She is at risk of relapse, so we will continue to monitor every 4 months for now. 2) Vaccine schedule As of 3/2017 she has completed the vaccine schedule for pneumoccal, Hflu, and meninogoccal vaccines, in case she needs a splenectomy in the future. 3) Cancer screening Never had a mammogram.  I again I recommended she have this done, but she is not interested. Last colonoscopy 6/2016 with Dr. Frances Gaytan, repeat due 2026. Plan:  
 
· Labs in 4 months: CBC · Return to clinic in about 4 months Signed By: Faheem Hui MD

## 2019-01-24 NOTE — PROGRESS NOTES
Margi Mcnulty is a 75 y.o. female follow up for ITP. 1. Have you been to the ER, urgent care clinic since your last visit? Hospitalized since your last visit? NO 
 
2. Have you seen or consulted any other health care providers outside of the [address] [place] [address] since your last visit? Include any pap smears or colon screening.  No

## 2019-05-13 ENCOUNTER — TELEPHONE (OUTPATIENT)
Dept: ONCOLOGY | Age: 74
End: 2019-05-13

## 2019-05-17 NOTE — PROGRESS NOTES
[facility] [place] at [address] [facility]  [phone]  F: [phone]      Reason for Visit:   Damon Mir is a 75 y.o. female who is seen for follow up of ITP. Treatment History:    · Diagnosed 6/3/2016  · IVIG 6/3/2016  · Prednisone from 6/3/2016 - approximately 11/15/2016  · IVIG 2016 - Relapsed once prednisone tapered to 5mg  · Rituximab weekly 9/15/2016 - 10/6/2016 for a total of 4 cycles     History of Present Illness:   She continues to feel well. No bleeding. No fevers, chills, night sweats, unintentional weight loss, adenopathy. No rash. No recent infection. Energy good, remains active. Sister present today. PAST HISTORY: The following sections were reviewed and updated in the EMR as appropriate: PMH, SH, FH, Medications, Allergies. No Known Allergies       Review of Systems: A complete review of systems was obtained, reviewed, and scanned into the EMR. Pertinent findings reviewed above. Physical Exam:     Visit Vitals  /62 (BP 1 Location: Right arm, BP Patient Position: Sitting)   Pulse (!) 56   Temp 97.8 °F (36.6 °C) (Oral)   Resp 16   Ht 5' 4\" (1.626 m)   Wt 154 lb 12.8 oz (70.2 kg)   SpO2 97%   BMI 26.57 kg/m²     General: No distress  Eyes: PERRLA, anicteric sclerae  HENT: Atraumatic, OP clear  Neck: Supple  Lymphatic: No cervical, supraclavicular, or inguinal adenopathy  Respiratory: CTAB, normal respiratory effort  CV: Normal rate, regular rhythm, no murmurs, no peripheral edema  GI: Soft, nontender, nondistended, no masses, no hepatomegaly, no splenomegaly  MS: Normal gait and station. Digits without clubbing or cyanosis. Skin: No rashes, or petechiae. Normal temperature, turgor, and texture.     Psych: Alert, oriented, appropriate affect, normal judgment/insight    Results:     Lab Results   Component Value Date/Time    WBC 4.9 2019 09:12 AM    HGB 11.8 2019 09:12 AM    HCT 36.7 05/21/2019 09:12 AM    PLATELET 189 77/92/2384 09:12 AM    MCV 78 (L) 05/21/2019 09:12 AM    ABS. NEUTROPHILS 2.3 01/22/2019 10:44 AM    Hemoglobin (POC) 14.3 10/06/2016 09:24 AM    Hematocrit (POC) 42 10/06/2016 09:24 AM     Lab Results   Component Value Date/Time    Sodium 138 08/17/2016 05:14 AM    Potassium 3.9 08/17/2016 05:14 AM    Chloride 104 08/17/2016 05:14 AM    CO2 27 08/17/2016 05:14 AM    Glucose 114 (H) 08/17/2016 05:14 AM    BUN 14 08/17/2016 05:14 AM    Creatinine 0.93 08/17/2016 05:14 AM    GFR est AA >60 08/17/2016 05:14 AM    GFR est non-AA 59 (L) 08/17/2016 05:14 AM    Calcium 9.4 08/17/2016 05:14 AM    Sodium (POC) 140 10/06/2016 09:24 AM    Potassium (POC) 3.5 10/06/2016 09:24 AM    Chloride (POC) 101 10/06/2016 09:24 AM    Glucose (POC) 83 10/06/2016 09:24 AM    BUN (POC) 17 10/06/2016 09:24 AM    Creatinine (POC) 1.0 10/06/2016 09:24 AM    Calcium, ionized (POC) 1.21 10/06/2016 09:24 AM     Lab Results   Component Value Date/Time    Bilirubin, total 0.6 08/17/2016 05:14 AM    ALT (SGPT) 20 08/17/2016 05:14 AM    AST (SGOT) 36 08/17/2016 05:14 AM    Alk. phosphatase 44 (L) 08/17/2016 05:14 AM    Protein, total 8.5 (H) 08/17/2016 05:14 AM    Albumin 3.2 (L) 08/17/2016 05:14 AM    Globulin 5.3 (H) 08/17/2016 05:14 AM     Lab Results   Component Value Date/Time    Reticulocyte count 0.8 06/03/2016 01:28 PM    Iron % saturation 16 (L) 06/04/2016 03:46 AM    TIBC 231 (L) 06/04/2016 03:46 AM    Ferritin 128 06/03/2016 01:20 PM    Vitamin B12 231 06/03/2016 01:20 PM    Folate 10.7 06/03/2016 01:20 PM    Homocysteine, plasma 8.1 06/04/2016 04:00 PM    Methylmalonic acid 97 06/04/2016 04:00 PM    Haptoglobin 68 06/04/2016 03:46 AM     06/03/2016 01:28 PM    Hep C  virus Ab Interp.  NONREACTIVE 06/04/2016 03:46 AM    HIV 1/2 Interpretation NONREACTIVE 06/03/2016 02:30 PM     Lab Results   Component Value Date/Time    INR 1.0 06/03/2016 01:28 PM    aPTT 33.5 (H) 06/03/2016 01:28 PM    Fibrinogen 250 06/03/2016 03:15 PM     PLATELET   Date Value   05/21/2019 240 x10E3/uL   01/22/2019 243 x10E3/uL   09/24/2018 262 x10E3/uL   05/21/2018 258 x10E3/uL   10/27/2016 190 K/uL   10/06/2016 159 K/uL   09/29/2016 183 K/uL   09/22/2016 176 K/uL   09/15/2016 127 K/uL (L)   09/06/2016 221 K/uL   08/23/2016 306 K/uL   08/18/2016 89 K/uL (L)   08/17/2016 36 K/uL (LL)   08/16/2016 20 K/uL (LL)   07/29/2016 231 K/uL   06/08/2016 231 K/uL   06/05/2016 66 K/uL (L)   06/04/2016 59 K/uL (L)   06/04/2016 40 K/uL (LL)   06/03/2016 39 K/uL (LL)   06/03/2016 3 K/uL (LL)       Platelet count: (Full CBC scanned into media)  02/19/2018 279  11/14/2017 237  08/08/2017 225  05/08/2017 248  02/06/2017 242  01/24/2017 254  01/10/2017 278  12/27/2017 243  12/12/2017 247  11/28/2016 250  11/21/2016 250  11/14/2016 254 Prednisone stopped  11/07/2016 247 Pred dropped to 2.5 mg every other day  10/31/2016 210 Pred dropped to 2.5mg   10/27/2016 190 Pred dropped to 5mg  10/17/2016 157    10/10/2016 116 Pred dropped to 10mg 10/12  10/06/2016 159 Rituximab completed  09/29/2016 183 Pred dropped to 20mg   09/22/2016 176  09/15/2016 127 Rituximab weekly started  09/12/2016 129  09/06/2016 221  08/29/2016 269 Pred dropped to 40mg 8/30 08/23/2016 306  08/18/2016 89  08/16/2016 20 Pred increased to 60mg and IVIG x1  08/15/2016 5  07/20/2016 231  07/18/2016 274  Pred dropped to 10 mg 7/20 07/11/2016 269  07/05/2016 186  06/27/2016 146  06/20/2016 230   Pred dropped to 20 mg 6/22 06/14/2016 320 Pred dropped to 40 mg 6/15  06/08/2016 231  06/03/2016 3  Pred started at 60 mg daily and IVIG x1 dose    EGD 6/13/2016 by Dr. Belinda Rodríguez:    H. Pylori negative   Esophagus:normal, early schatzki's ring that appeared widely patent   Stomach: Small size hiatal hernia, otherwise mucosa within normal.   Duodenum/jejunum: normal    Colonoscopy 6/13/2016 by Dr. Belinda Rodríguez:    Terminal Ileum: normal   Cecum: normal   Ascending Colon: normal   Transverse Colon: normal   Descending Colon: normal   Sigmoid: normal   Rectum: normal, small internal hemorrhoids       Assessment:   1) ITP, remission  Diagnosed 6/3/2016, responded initially to IVIG and steroids, but relapsed when prednisone tapered to 5mg. Responded once again to IVIG and prednisone, but relapsed while still on prednisone 40mg. S/p Rituximab weekly x 4 cycles completed 10/2016, achieving remission. Platelet count remains normal, she remains in remission. She is at risk of relapse, so we will continue to monitor but reduce to every 6 months. We discussed s/sx that should prompt a more urgent follow up for lab check. 2) Vaccine schedule  As of 3/2017 she has completed the vaccine schedule for pneumoccal, Hflu, and meninogoccal vaccines, in case she needs a splenectomy in the future. 3) Cancer screening  Never had a mammogram.  I again I recommended she have this done, but she is not interested. Last colonoscopy 6/2016 with Dr. Jose Nguyễn, repeat due 2026.     Plan:     · Labs in 6 months: CBC  · Return to clinic in about 6 months    Signed By: Christi Carranza MD

## 2019-05-22 LAB
ERYTHROCYTE [DISTWIDTH] IN BLOOD BY AUTOMATED COUNT: 14.8 % (ref 12.3–15.4)
HCT VFR BLD AUTO: 36.7 % (ref 34–46.6)
HGB BLD-MCNC: 11.8 G/DL (ref 11.1–15.9)
MCH RBC QN AUTO: 25.1 PG (ref 26.6–33)
MCHC RBC AUTO-ENTMCNC: 32.2 G/DL (ref 31.5–35.7)
MCV RBC AUTO: 78 FL (ref 79–97)
PLATELET # BLD AUTO: 240 X10E3/UL (ref 150–450)
RBC # BLD AUTO: 4.71 X10E6/UL (ref 3.77–5.28)
WBC # BLD AUTO: 4.9 X10E3/UL (ref 3.4–10.8)

## 2019-05-23 ENCOUNTER — OFFICE VISIT (OUTPATIENT)
Dept: ONCOLOGY | Age: 74
End: 2019-05-23

## 2019-05-23 VITALS
DIASTOLIC BLOOD PRESSURE: 62 MMHG | TEMPERATURE: 97.8 F | OXYGEN SATURATION: 97 % | RESPIRATION RATE: 16 BRPM | HEART RATE: 56 BPM | WEIGHT: 154.8 LBS | SYSTOLIC BLOOD PRESSURE: 139 MMHG | HEIGHT: 64 IN | BODY MASS INDEX: 26.43 KG/M2

## 2019-05-23 DIAGNOSIS — Z86.2 HISTORY OF ITP: Primary | ICD-10-CM

## 2019-05-23 NOTE — PROGRESS NOTES
Darnell Vargas is a 75 y.o. female here for follow up of ITP. 1. Have you been to the ER, urgent care clinic since your last visit? Hospitalized since your last visit? No    2. Have you seen or consulted any other health care providers outside of the [address] [place] [address] since your last visit? Include any pap smears or colon screening.  No

## 2019-11-18 NOTE — PROGRESS NOTES
[facility] [place] at [address], [address] [facility] 100[address] [facility]  Curtis Parrot: [phone]  F: [phone]      Reason for Visit:   Lux Pittman is a 75 y.o. female who is seen for follow up of ITP. Treatment History:    · Diagnosed 6/3/2016  · IVIG 6/3/2016  · Prednisone from 6/3/2016 - approximately 11/15/2016  · IVIG 8/16/2016 - Relapsed once prednisone tapered to 5mg  · Rituximab weekly 9/15/2016 - 10/6/2016 for a total of 4 cycles     History of Present Illness:   She continues to feel well without any new problems. No recent infections. No fevers, chills, night sweats, unintentional weight loss, adenopathy. No bleeding or bruising. Good energy. Sister present today. PAST HISTORY: The following sections were reviewed and updated in the EMR as appropriate: PMH, SH, FH, Medications, Allergies. No Known Allergies       Review of Systems: A complete review of systems was obtained, reviewed, and scanned into the EMR. Pertinent findings reviewed above. Physical Exam:     Visit Vitals  /62 (BP 1 Location: Right arm, BP Patient Position: Sitting) Comment: . Pulse 85   Temp 97.6 °F (36.4 °C) (Temporal)   Resp 18   Ht 5' 4\" (1.626 m)   Wt 154 lb (69.9 kg)   SpO2 97%   BMI 26.43 kg/m²     General: No distress  Eyes: PERRLA, anicteric sclerae  HENT: Atraumatic, OP clear  Neck: Supple  Lymphatic: No cervical, supraclavicular, or inguinal adenopathy  Respiratory: CTAB, normal respiratory effort  CV: Normal rate, regular rhythm, no murmurs, no peripheral edema  GI: Soft, nontender, nondistended, no masses, no hepatomegaly, no splenomegaly  MS: Normal gait and station. Digits without clubbing or cyanosis. Skin: No rashes, or petechiae. Normal temperature, turgor, and texture.     Psych: Alert, oriented, appropriate affect, normal judgment/insight    Results:     Lab Results   Component Value Date/Time    WBC 5.7 11/18/2019 01:19 PM    HGB 12.5 11/18/2019 01:19 PM    HCT 39.6 11/18/2019 01:19 PM    PLATELET 194 84/84/8171 01:19 PM    MCV 80 11/18/2019 01:19 PM    ABS. NEUTROPHILS 2.3 11/18/2019 01:19 PM    Hemoglobin (POC) 14.3 10/06/2016 09:24 AM    Hematocrit (POC) 42 10/06/2016 09:24 AM     Lab Results   Component Value Date/Time    Sodium 138 08/17/2016 05:14 AM    Potassium 3.9 08/17/2016 05:14 AM    Chloride 104 08/17/2016 05:14 AM    CO2 27 08/17/2016 05:14 AM    Glucose 114 (H) 08/17/2016 05:14 AM    BUN 14 08/17/2016 05:14 AM    Creatinine 0.93 08/17/2016 05:14 AM    GFR est AA >60 08/17/2016 05:14 AM    GFR est non-AA 59 (L) 08/17/2016 05:14 AM    Calcium 9.4 08/17/2016 05:14 AM    Sodium (POC) 140 10/06/2016 09:24 AM    Potassium (POC) 3.5 10/06/2016 09:24 AM    Chloride (POC) 101 10/06/2016 09:24 AM    Glucose (POC) 83 10/06/2016 09:24 AM    BUN (POC) 17 10/06/2016 09:24 AM    Creatinine (POC) 1.0 10/06/2016 09:24 AM    Calcium, ionized (POC) 1.21 10/06/2016 09:24 AM     Lab Results   Component Value Date/Time    Bilirubin, total 0.6 08/17/2016 05:14 AM    ALT (SGPT) 20 08/17/2016 05:14 AM    AST (SGOT) 36 08/17/2016 05:14 AM    Alk. phosphatase 44 (L) 08/17/2016 05:14 AM    Protein, total 8.5 (H) 08/17/2016 05:14 AM    Albumin 3.2 (L) 08/17/2016 05:14 AM    Globulin 5.3 (H) 08/17/2016 05:14 AM     Lab Results   Component Value Date/Time    Reticulocyte count 0.8 06/03/2016 01:28 PM    Iron % saturation 16 (L) 06/04/2016 03:46 AM    TIBC 231 (L) 06/04/2016 03:46 AM    Ferritin 128 06/03/2016 01:20 PM    Vitamin B12 231 06/03/2016 01:20 PM    Folate 10.7 06/03/2016 01:20 PM    Homocysteine, plasma 8.1 06/04/2016 04:00 PM    Methylmalonic acid 97 06/04/2016 04:00 PM    Haptoglobin 68 06/04/2016 03:46 AM     06/03/2016 01:28 PM    Hep C  virus Ab Interp.  NONREACTIVE 06/04/2016 03:46 AM    HIV 1/2 Interpretation NONREACTIVE 06/03/2016 02:30 PM     Lab Results   Component Value Date/Time    INR 1.0 06/03/2016 01:28 PM    aPTT 33.5 (H) 06/03/2016 01:28 PM Fibrinogen 250 06/03/2016 03:15 PM     PLATELET   Date Value   11/18/2019 274 x10E3/uL   05/21/2019 240 x10E3/uL   01/22/2019 243 x10E3/uL   09/24/2018 262 x10E3/uL   05/21/2018 258 x10E3/uL   10/27/2016 190 K/uL   10/06/2016 159 K/uL   09/29/2016 183 K/uL   09/22/2016 176 K/uL   09/15/2016 127 K/uL (L)   09/06/2016 221 K/uL   08/23/2016 306 K/uL   08/18/2016 89 K/uL (L)   08/17/2016 36 K/uL (LL)   08/16/2016 20 K/uL (LL)   07/29/2016 231 K/uL   06/08/2016 231 K/uL   06/05/2016 66 K/uL (L)   06/04/2016 59 K/uL (L)   06/04/2016 40 K/uL (LL)   06/03/2016 39 K/uL (LL)   06/03/2016 3 K/uL (LL)       Platelet count: (Full CBC scanned into media)  02/19/2018 279  11/14/2017 237  08/08/2017 225  05/08/2017 248  02/06/2017 242  01/24/2017 254  01/10/2017 278  12/27/2017 243  12/12/2017 247  11/28/2016 250  11/21/2016 250  11/14/2016 254 Prednisone stopped  11/07/2016 247 Pred dropped to 2.5 mg every other day  10/31/2016 210 Pred dropped to 2.5mg   10/27/2016 190 Pred dropped to 5mg  10/17/2016 157    10/10/2016 116 Pred dropped to 10mg 10/12  10/06/2016 159 Rituximab completed  09/29/2016 183 Pred dropped to 20mg   09/22/2016 176  09/15/2016 127 Rituximab weekly started  09/12/2016 129  09/06/2016 221  08/29/2016 269 Pred dropped to 40mg 8/30 08/23/2016 306  08/18/2016 89  08/16/2016 20 Pred increased to 60mg and IVIG x1  08/15/2016 5  07/20/2016 231  07/18/2016 274  Pred dropped to 10 mg 7/20 07/11/2016 269  07/05/2016 186  06/27/2016 146  06/20/2016 230   Pred dropped to 20 mg 6/22 06/14/2016 320 Pred dropped to 40 mg 6/15  06/08/2016 231  06/03/2016 3  Pred started at 60 mg daily and IVIG x1 dose    EGD 6/13/2016 by Dr. Kristi Herrera:    H. Pylori negative   Esophagus:normal, early schatzki's ring that appeared widely patent   Stomach: Small size hiatal hernia, otherwise mucosa within normal.   Duodenum/jejunum: normal    Colonoscopy 6/13/2016 by Dr. Kristi Herrera:    Terminal Ileum: normal   Cecum: normal   Ascending Colon: normal   Transverse Colon: normal   Descending Colon: normal   Sigmoid: normal   Rectum: normal, small internal hemorrhoids       Assessment:   1) ITP, remission  Diagnosed 6/3/2016, responded initially to IVIG and steroids, but relapsed when prednisone tapered to 5mg. Responded once again to IVIG and prednisone, but relapsed while still on prednisone 40mg. S/p Rituximab weekly x 4 cycles completed 10/2016, achieving remission. Platelet count remains normal, she remains in remission. She is at risk of relapse, so we will continue to monitor every 6 months. We have discussed the signs and symptoms that should prompt a more urgent follow up for lab check. 2) Vaccine schedule  As of 3/2017 she has completed the vaccine schedule for pneumoccal, Hflu, and meninogoccal vaccines, in case she needs a splenectomy in the future. 3) Cancer screening  Declines mammogram.  Last colonoscopy 6/2016 with Dr. Roxanna Jackson, repeat due 2026.     Plan:     · Labs in 6 months: CBC  · Return to clinic in about 6 months    Signed By: Shanell Parks MD

## 2019-11-19 LAB
BASOPHILS # BLD AUTO: 0.1 X10E3/UL (ref 0–0.2)
BASOPHILS NFR BLD AUTO: 2 %
EOSINOPHIL # BLD AUTO: 0.1 X10E3/UL (ref 0–0.4)
EOSINOPHIL NFR BLD AUTO: 2 %
ERYTHROCYTE [DISTWIDTH] IN BLOOD BY AUTOMATED COUNT: 14.1 % (ref 12.3–15.4)
HCT VFR BLD AUTO: 39.6 % (ref 34–46.6)
HGB BLD-MCNC: 12.5 G/DL (ref 11.1–15.9)
IMM GRANULOCYTES # BLD AUTO: 0 X10E3/UL (ref 0–0.1)
IMM GRANULOCYTES NFR BLD AUTO: 0 %
LYMPHOCYTES # BLD AUTO: 2.8 X10E3/UL (ref 0.7–3.1)
LYMPHOCYTES NFR BLD AUTO: 49 %
MCH RBC QN AUTO: 25.4 PG (ref 26.6–33)
MCHC RBC AUTO-ENTMCNC: 31.6 G/DL (ref 31.5–35.7)
MCV RBC AUTO: 80 FL (ref 79–97)
MONOCYTES # BLD AUTO: 0.4 X10E3/UL (ref 0.1–0.9)
MONOCYTES NFR BLD AUTO: 6 %
NEUTROPHILS # BLD AUTO: 2.3 X10E3/UL (ref 1.4–7)
NEUTROPHILS NFR BLD AUTO: 41 %
PLATELET # BLD AUTO: 274 X10E3/UL (ref 150–450)
RBC # BLD AUTO: 4.93 X10E6/UL (ref 3.77–5.28)
WBC # BLD AUTO: 5.7 X10E3/UL (ref 3.4–10.8)

## 2019-11-21 ENCOUNTER — OFFICE VISIT (OUTPATIENT)
Dept: ONCOLOGY | Age: 74
End: 2019-11-21

## 2019-11-21 VITALS
HEART RATE: 85 BPM | SYSTOLIC BLOOD PRESSURE: 141 MMHG | BODY MASS INDEX: 26.29 KG/M2 | RESPIRATION RATE: 18 BRPM | OXYGEN SATURATION: 97 % | WEIGHT: 154 LBS | DIASTOLIC BLOOD PRESSURE: 62 MMHG | HEIGHT: 64 IN | TEMPERATURE: 97.6 F

## 2019-11-21 DIAGNOSIS — Z86.2 HISTORY OF ITP: Primary | ICD-10-CM

## 2019-11-21 NOTE — PROGRESS NOTES
Ezequielyancy Cruz is a 75 y.o. female follow up for ITP. 1. Have you been to the ER, urgent care clinic since your last visit? Hospitalized since your last visit?no     2. Have you seen or consulted any other health care providers outside of the [address] [place] [address] since your last visit? Include any pap smears or colon screening.  no

## 2020-05-18 ENCOUNTER — TELEPHONE (OUTPATIENT)
Dept: ONCOLOGY | Age: 75
End: 2020-05-18

## 2020-05-18 NOTE — TELEPHONE ENCOUNTER
[employer] at [facility]  [phone]    05/18/20- Phone call placed to patient spoke to Remy, patients sister reminded her that patient needs labs drawn prior to her follow up appointment with . Chacho verbalized understanding.

## 2020-05-26 NOTE — PROGRESS NOTES
[facility] [place] at [facility]  [address] [facility], [address] [place] [address] [phone]  F: [phone]      Reason for Visit:   Artur Chavez is a 75 y.o. female who is seen for follow up of ITP. Treatment History:    · Diagnosed 6/3/2016  · IVIG 6/3/2016  · Prednisone from 6/3/2016 - approximately 11/15/2016  · IVIG 2016 - Relapsed once prednisone tapered to 5mg  · Rituximab weekly 9/15/2016 - 10/6/2016 for a total of 4 cycles     History of Present Illness:   She has been well. Good energy. Stays busy. No recent infections. No bleeding. No bruising. PAST HISTORY: The following sections were reviewed and updated in the EMR as appropriate: PMH, SH, FH, Medications, Allergies. No Known Allergies       Review of Systems: A complete review of systems was obtained, reviewed, and scanned into the EMR. Pertinent findings reviewed above. Physical Exam:     Visit Vitals  Pulse 87   Temp 97.9 °F (36.6 °C) (Temporal)   Resp 14   Ht 5' 4\" (1.626 m)   Wt 154 lb (69.9 kg)   SpO2 98%   BMI 26.43 kg/m²     General: No distress  Respiratory: Normal respiratory effort  CV: No peripheral edema  Skin: No rashes, ecchymoses, or petechiae  Psych: Alert, oriented, normal mood/affect      Results:     Lab Results   Component Value Date/Time    WBC 4.7 2020 08:44 AM    HGB 12.3 2020 08:44 AM    HCT 38.7 2020 08:44 AM    PLATELET 468  08:44 AM    MCV 79 2020 08:44 AM    ABS.  NEUTROPHILS 2.0 2020 08:44 AM    Hemoglobin (POC) 14.3 10/06/2016 09:24 AM    Hematocrit (POC) 42 10/06/2016 09:24 AM     Lab Results   Component Value Date/Time    Sodium 138 2016 05:14 AM    Potassium 3.9 2016 05:14 AM    Chloride 104 2016 05:14 AM    CO2 27 2016 05:14 AM    Glucose 114 (H) 2016 05:14 AM    BUN 14 2016 05:14 AM    Creatinine 0.93 2016 05:14 AM    GFR est AA >60 2016 05:14 AM    GFR est non-AA 59 (L) 08/17/2016 05:14 AM    Calcium 9.4 08/17/2016 05:14 AM    Sodium (POC) 140 10/06/2016 09:24 AM    Potassium (POC) 3.5 10/06/2016 09:24 AM    Chloride (POC) 101 10/06/2016 09:24 AM    Glucose (POC) 83 10/06/2016 09:24 AM    BUN (POC) 17 10/06/2016 09:24 AM    Creatinine (POC) 1.0 10/06/2016 09:24 AM    Calcium, ionized (POC) 1.21 10/06/2016 09:24 AM     Lab Results   Component Value Date/Time    Bilirubin, total 0.6 08/17/2016 05:14 AM    ALT (SGPT) 20 08/17/2016 05:14 AM    Alk. phosphatase 44 (L) 08/17/2016 05:14 AM    Protein, total 8.5 (H) 08/17/2016 05:14 AM    Albumin 3.2 (L) 08/17/2016 05:14 AM    Globulin 5.3 (H) 08/17/2016 05:14 AM     Lab Results   Component Value Date/Time    Reticulocyte count 0.8 06/03/2016 01:28 PM    Iron % saturation 16 (L) 06/04/2016 03:46 AM    TIBC 231 (L) 06/04/2016 03:46 AM    Ferritin 128 06/03/2016 01:20 PM    Vitamin B12 231 06/03/2016 01:20 PM    Folate 10.7 06/03/2016 01:20 PM    Homocysteine, plasma 8.1 06/04/2016 04:00 PM    Methylmalonic acid 97 06/04/2016 04:00 PM    Haptoglobin 68 06/04/2016 03:46 AM     06/03/2016 01:28 PM    Hep C  virus Ab Interp.  NONREACTIVE 06/04/2016 03:46 AM    HIV 1/2 Interpretation NONREACTIVE 06/03/2016 02:30 PM     Lab Results   Component Value Date/Time    INR 1.0 06/03/2016 01:28 PM    aPTT 33.5 (H) 06/03/2016 01:28 PM    Fibrinogen 250 06/03/2016 03:15 PM     PLATELET   Date Value   05/26/2020 279 x10E3/uL   11/18/2019 274 x10E3/uL   05/21/2019 240 x10E3/uL   01/22/2019 243 x10E3/uL   09/24/2018 262 x10E3/uL   05/21/2018 258 x10E3/uL   10/27/2016 190 K/uL   10/06/2016 159 K/uL   09/29/2016 183 K/uL   09/22/2016 176 K/uL   09/15/2016 127 K/uL (L)   09/06/2016 221 K/uL   08/23/2016 306 K/uL   08/18/2016 89 K/uL (L)   08/17/2016 36 K/uL (LL)   08/16/2016 20 K/uL (LL)   07/29/2016 231 K/uL   06/08/2016 231 K/uL   06/05/2016 66 K/uL (L)   06/04/2016 59 K/uL (L)   06/04/2016 40 K/uL (LL)   06/03/2016 39 K/uL (LL)   06/03/2016 3 K/uL (LL) Platelet count: (Full CBC scanned into media)  02/19/2018 279  11/14/2017 237  08/08/2017 225  05/08/2017 248  02/06/2017 242  01/24/2017 254  01/10/2017 278  12/27/2017 243  12/12/2017 247  11/28/2016 250  11/21/2016 250  11/14/2016 254 Prednisone stopped  11/07/2016 247 Pred dropped to 2.5 mg every other day  10/31/2016 210 Pred dropped to 2.5mg   10/27/2016 190 Pred dropped to 5mg  10/17/2016 157    10/10/2016 116 Pred dropped to 10mg 10/12  10/06/2016 159 Rituximab completed  09/29/2016 183 Pred dropped to 20mg   09/22/2016 176  09/15/2016 127 Rituximab weekly started  09/12/2016 129  09/06/2016 221  08/29/2016 269 Pred dropped to 40mg 8/30 08/23/2016 306  08/18/2016 89  08/16/2016 20 Pred increased to 60mg and IVIG x1  08/15/2016 5  07/20/2016 231  07/18/2016 274  Pred dropped to 10 mg 7/20 07/11/2016 269  07/05/2016 186  06/27/2016 146  06/20/2016 230   Pred dropped to 20 mg 6/22 06/14/2016 320 Pred dropped to 40 mg 6/15  06/08/2016 231  06/03/2016 3  Pred started at 60 mg daily and IVIG x1 dose    EGD 6/13/2016 by Dr. Galen Gonzalez:    H. Pylori negative   Esophagus:normal, early schatzki's ring that appeared widely patent   Stomach: Small size hiatal hernia, otherwise mucosa within normal.   Duodenum/jejunum: normal    Colonoscopy 6/13/2016 by Dr. Galen Gonzalez:    Terminal Ileum: normal   Cecum: normal   Ascending Colon: normal   Transverse Colon: normal   Descending Colon: normal   Sigmoid: normal   Rectum: normal, small internal hemorrhoids       Assessment:   1) ITP, remission  Diagnosed 6/3/2016, responded initially to IVIG and steroids, but relapsed when prednisone tapered to 5mg. Responded once again to IVIG and prednisone, but relapsed while still on prednisone 40mg. S/p Rituximab weekly x 4 cycles completed 10/2016, achieving remission. Platelet count remains normal, she remains in remission. She is at risk of relapse, so we will continue to monitor every 6 months.   We have discussed the signs and symptoms that should prompt a more urgent follow up for lab check. When 5 years out form Rituximab, we can consider reducing to yearly visits, alternating with her PCP. 2) Vaccine schedule  As of 3/2017 she has completed the vaccine schedule for pneumoccal, Hflu, and meninogoccal vaccines, in case she needs a splenectomy in the future. 3) Cancer screening  Still declines mammogram.  Last colonoscopy 6/2016 with Dr. Abisai Gardner, repeat due 2026. Plan:     · Labs in 6 months: CBC ([facility])  · Return to clinic in about 6 months    She is unable to do video visits.     Signed By: Jayson Fernández MD

## 2020-05-27 LAB
BASOPHILS # BLD AUTO: 0.1 X10E3/UL (ref 0–0.2)
BASOPHILS NFR BLD AUTO: 2 %
EOSINOPHIL # BLD AUTO: 0.1 X10E3/UL (ref 0–0.4)
EOSINOPHIL NFR BLD AUTO: 3 %
ERYTHROCYTE [DISTWIDTH] IN BLOOD BY AUTOMATED COUNT: 13.9 % (ref 11.7–15.4)
HCT VFR BLD AUTO: 38.7 % (ref 34–46.6)
HGB BLD-MCNC: 12.3 G/DL (ref 11.1–15.9)
IMM GRANULOCYTES # BLD AUTO: 0 X10E3/UL (ref 0–0.1)
IMM GRANULOCYTES NFR BLD AUTO: 0 %
LYMPHOCYTES # BLD AUTO: 2.2 X10E3/UL (ref 0.7–3.1)
LYMPHOCYTES NFR BLD AUTO: 46 %
MCH RBC QN AUTO: 25.1 PG (ref 26.6–33)
MCHC RBC AUTO-ENTMCNC: 31.8 G/DL (ref 31.5–35.7)
MCV RBC AUTO: 79 FL (ref 79–97)
MONOCYTES # BLD AUTO: 0.3 X10E3/UL (ref 0.1–0.9)
MONOCYTES NFR BLD AUTO: 7 %
NEUTROPHILS # BLD AUTO: 2 X10E3/UL (ref 1.4–7)
NEUTROPHILS NFR BLD AUTO: 42 %
PLATELET # BLD AUTO: 279 X10E3/UL (ref 150–450)
RBC # BLD AUTO: 4.9 X10E6/UL (ref 3.77–5.28)
WBC # BLD AUTO: 4.7 X10E3/UL (ref 3.4–10.8)

## 2020-05-28 ENCOUNTER — OFFICE VISIT (OUTPATIENT)
Dept: ONCOLOGY | Age: 75
End: 2020-05-28

## 2020-05-28 VITALS
OXYGEN SATURATION: 98 % | BODY MASS INDEX: 26.29 KG/M2 | WEIGHT: 154 LBS | HEIGHT: 64 IN | RESPIRATION RATE: 14 BRPM | HEART RATE: 87 BPM | TEMPERATURE: 97.9 F

## 2020-05-28 DIAGNOSIS — Z86.2 HISTORY OF ITP: Primary | ICD-10-CM

## 2020-05-28 NOTE — PROGRESS NOTES
Lokesh Grider is a 75 y.o. female follow up for ITP. 1. Have you been to the ER, urgent care clinic since your last visit? Hospitalized since your last visit?no    2. Have you seen or consulted any other health care providers outside of the [address] [place] [address] since your last visit? Include any pap smears or colon screening.  no

## 2020-11-23 ENCOUNTER — TELEPHONE (OUTPATIENT)
Dept: ONCOLOGY | Age: 75
End: 2020-11-23

## 2020-11-23 NOTE — TELEPHONE ENCOUNTER
[address] at [facility]  [phone]  11/23/20- Returned Yamile's phone call she is inquiring if visit can be a virtual visit- advised her that visit can be changed. She then stated she has no capability to this feature. She will keep appointment as schedule.

## 2020-12-01 LAB
BASOPHILS # BLD AUTO: 0.1 X10E3/UL (ref 0–0.2)
BASOPHILS NFR BLD AUTO: 1 %
EOSINOPHIL # BLD AUTO: 0.1 X10E3/UL (ref 0–0.4)
EOSINOPHIL NFR BLD AUTO: 2 %
ERYTHROCYTE [DISTWIDTH] IN BLOOD BY AUTOMATED COUNT: 13.8 % (ref 11.7–15.4)
HCT VFR BLD AUTO: 39.3 % (ref 34–46.6)
HGB BLD-MCNC: 12.9 G/DL (ref 11.1–15.9)
IMM GRANULOCYTES # BLD AUTO: 0 X10E3/UL (ref 0–0.1)
IMM GRANULOCYTES NFR BLD AUTO: 0 %
LYMPHOCYTES # BLD AUTO: 2.5 X10E3/UL (ref 0.7–3.1)
LYMPHOCYTES NFR BLD AUTO: 42 %
MCH RBC QN AUTO: 25.9 PG (ref 26.6–33)
MCHC RBC AUTO-ENTMCNC: 32.8 G/DL (ref 31.5–35.7)
MCV RBC AUTO: 79 FL (ref 79–97)
MONOCYTES # BLD AUTO: 0.4 X10E3/UL (ref 0.1–0.9)
MONOCYTES NFR BLD AUTO: 6 %
NEUTROPHILS # BLD AUTO: 2.9 X10E3/UL (ref 1.4–7)
NEUTROPHILS NFR BLD AUTO: 49 %
PLATELET # BLD AUTO: 275 X10E3/UL (ref 150–450)
RBC # BLD AUTO: 4.99 X10E6/UL (ref 3.77–5.28)
WBC # BLD AUTO: 5.9 X10E3/UL (ref 3.4–10.8)

## 2021-05-24 DIAGNOSIS — Z86.2 HISTORY OF ITP: Primary | ICD-10-CM

## 2021-05-30 NOTE — PROGRESS NOTES
Can[facility] [place] at [address], [address] [facility] 100[address] [facility]  Jaylen Roscoe: [phone]  F: [phone]      Reason for Visit:   Ila Allen is a 67 y.o. female who is seen for follow up of ITP. Treatment History:    · Diagnosed 6/3/2016  · IVIG 6/3/2016  · Prednisone from 6/3/2016 - approximately 11/15/2016  · IVIG 8/16/2016 - Relapsed once prednisone tapered to 5mg  · Rituximab weekly 9/15/2016 - 10/6/2016 for a total of 4 cycles     History of Present Illness:   She has been feeling well. No new issues. No bleeding or bruising. No recent infections. She has been vaccinated against [address]. Review of systems was obtained and pertinent findings reviewed above. Past medical history, social history, family history, medications, and allergies are located in the electronic medical record. Physical Exam:     Visit Vitals  BP (!) 140/69 (BP 1 Location: Left upper arm, BP Patient Position: Sitting, BP Cuff Size: Large adult)   Pulse 74   Temp 97.5 °F (36.4 °C) (Oral)   Resp 14   Ht 5' 4\" (1.626 m)   Wt 141 lb (64 kg)   SpO2 97%   BMI 24.20 kg/m²     General: no distress  Respiratory: normal respiratory effort  CV: no peripheral edema  Skin: no rashes; no ecchymoses; no petechiae      Results:     Lab Results   Component Value Date/Time    WBC 5.3 05/28/2021 09:19 AM    HGB 12.4 05/28/2021 09:19 AM    HCT 38.9 05/28/2021 09:19 AM    PLATELET 642 41/35/6707 09:19 AM    MCV 81 05/28/2021 09:19 AM    ABS.  NEUTROPHILS 2.4 05/28/2021 09:19 AM    Hemoglobin (POC) 14.3 10/06/2016 09:24 AM    Hematocrit (POC) 42 10/06/2016 09:24 AM     Lab Results   Component Value Date/Time    Sodium 138 08/17/2016 05:14 AM    Potassium 3.9 08/17/2016 05:14 AM    Chloride 104 08/17/2016 05:14 AM    CO2 27 08/17/2016 05:14 AM    Glucose 114 (H) 08/17/2016 05:14 AM    BUN 14 08/17/2016 05:14 AM    Creatinine 0.93 08/17/2016 05:14 AM    GFR est AA >60 08/17/2016 05:14 AM    GFR est non-AA 59 (L) 08/17/2016 05:14 AM    Calcium 9.4 08/17/2016 05:14 AM    Sodium (POC) 140 10/06/2016 09:24 AM    Potassium (POC) 3.5 10/06/2016 09:24 AM    Chloride (POC) 101 10/06/2016 09:24 AM    Glucose (POC) 83 10/06/2016 09:24 AM    BUN (POC) 17 10/06/2016 09:24 AM    Creatinine (POC) 1.0 10/06/2016 09:24 AM    Calcium, ionized (POC) 1.21 10/06/2016 09:24 AM     Lab Results   Component Value Date/Time    Bilirubin, total 0.6 08/17/2016 05:14 AM    ALT (SGPT) 20 08/17/2016 05:14 AM    Alk. phosphatase 44 (L) 08/17/2016 05:14 AM    Protein, total 8.5 (H) 08/17/2016 05:14 AM    Albumin 3.2 (L) 08/17/2016 05:14 AM    Globulin 5.3 (H) 08/17/2016 05:14 AM     Lab Results   Component Value Date/Time    Reticulocyte count 0.8 06/03/2016 01:28 PM    Iron % saturation 16 (L) 06/04/2016 03:46 AM    TIBC 231 (L) 06/04/2016 03:46 AM    Ferritin 128 06/03/2016 01:20 PM    Vitamin B12 231 06/03/2016 01:20 PM    Folate 10.7 06/03/2016 01:20 PM    Homocysteine, plasma 8.1 06/04/2016 04:00 PM    Methylmalonic acid 97 06/04/2016 04:00 PM    Haptoglobin 68 06/04/2016 03:46 AM     06/03/2016 01:28 PM    Hep C virus Ab Interp.  NONREACTIVE 06/04/2016 03:46 AM    HIV 1/2 Interpretation NONREACTIVE 06/03/2016 02:30 PM     Lab Results   Component Value Date/Time    INR 1.0 06/03/2016 01:28 PM    aPTT 33.5 (H) 06/03/2016 01:28 PM    Fibrinogen 250 06/03/2016 03:15 PM     PLATELET   Date Value   05/28/2021 291 x10E3/uL   11/30/2020 275 x10E3/uL   05/26/2020 279 x10E3/uL   11/18/2019 274 x10E3/uL   05/21/2019 240 x10E3/uL   01/22/2019 243 x10E3/uL   09/24/2018 262 x10E3/uL   05/21/2018 258 x10E3/uL   10/27/2016 190 K/uL   10/06/2016 159 K/uL   09/29/2016 183 K/uL   09/22/2016 176 K/uL   09/15/2016 127 K/uL (L)   09/06/2016 221 K/uL   08/23/2016 306 K/uL   08/18/2016 89 K/uL (L)   08/17/2016 36 K/uL (LL)   08/16/2016 20 K/uL (LL)   07/29/2016 231 K/uL   06/08/2016 231 K/uL   06/05/2016 66 K/uL (L)   06/04/2016 59 K/uL (L)   06/04/2016 40 K/uL (LL)   06/03/2016 39 K/uL (LL)   06/03/2016 3 K/uL (LL)       Platelet count: (Full CBC scanned into media)  02/19/2018 279  11/14/2017 237  08/08/2017 225  05/08/2017 248  02/06/2017 242  01/24/2017 254  01/10/2017 278  12/27/2017 243  12/12/2017 247  11/28/2016 250  11/21/2016 250  11/14/2016 254 Prednisone stopped  11/07/2016 247 Pred dropped to 2.5 mg every other day  10/31/2016 210 Pred dropped to 2.5mg   10/27/2016 190 Pred dropped to 5mg  10/17/2016 157    10/10/2016 116 Pred dropped to 10mg 10/12  10/06/2016 159 Rituximab completed  09/29/2016 183 Pred dropped to 20mg   09/22/2016 176  09/15/2016 127 Rituximab weekly started  09/12/2016 129  09/06/2016 221  08/29/2016 269 Pred dropped to 40mg 8/30 08/23/2016 306  08/18/2016 89  08/16/2016 20 Pred increased to 60mg and IVIG x1  08/15/2016 5  07/20/2016 231  07/18/2016 274  Pred dropped to 10 mg 7/20 07/11/2016 269  07/05/2016 186  06/27/2016 146  06/20/2016 230   Pred dropped to 20 mg 6/22 06/14/2016 320 Pred dropped to 40 mg 6/15  06/08/2016 231  06/03/2016 3  Pred started at 60 mg daily and IVIG x1 dose    EGD 6/13/2016 by Dr. Wade Brewer:    H. Pylori negative   Esophagus:normal, early schatzki's ring that appeared widely patent   Stomach: Small size hiatal hernia, otherwise mucosa within normal.   Duodenum/jejunum: normal    Colonoscopy 6/13/2016 by Dr. Wade Brewer:    Terminal Ileum: normal   Cecum: normal   Ascending Colon: normal   Transverse Colon: normal   Descending Colon: normal   Sigmoid: normal   Rectum: normal, small internal hemorrhoids       Assessment:   1) ITP, remission  Diagnosed 6/3/2016, responded initially to IVIG and steroids, but relapsed when prednisone tapered to 5mg. Responded once again to IVIG and prednisone, but relapsed while still on prednisone 40mg. S/p Rituximab weekly x 4 cycles completed 10/2016, achieving remission. She is now followed with surveillance. Platelets remain normal at this time.     She is at risk of relapse, so we will continue to monitor every 6 months. We have discussed the signs and symptoms that should prompt a more urgent follow up for lab check. At her next visit she will be 5 years out form Rituximab, and we can consider reducing to yearly visits, alternating with her PCP. 2) Vaccine schedule  As of 3/2017 she has completed the vaccine schedule for pneumoccal, Hflu, and meninogoccal vaccines, in case she needs a splenectomy in the future. 3) Cancer screening  Still declines mammogram.  Last colonoscopy 6/2016 with Dr. Shahana Maddox, repeat due 2026. She completed her 2nd vaccine for COVID19 on 4/23/2021. Plan:     · Labs in 6 months: CBC ([facility])  · Return to clinic in about 6 months    [id number] Geovanna Ordonez for video visits.     Signed By: Collette Martines MD

## 2021-06-01 ENCOUNTER — OFFICE VISIT (OUTPATIENT)
Dept: ONCOLOGY | Age: 76
End: 2021-06-01
Payer: MEDICAID

## 2021-06-01 VITALS
SYSTOLIC BLOOD PRESSURE: 140 MMHG | RESPIRATION RATE: 14 BRPM | TEMPERATURE: 97.5 F | BODY MASS INDEX: 24.07 KG/M2 | WEIGHT: 141 LBS | OXYGEN SATURATION: 97 % | HEART RATE: 74 BPM | HEIGHT: 64 IN | DIASTOLIC BLOOD PRESSURE: 69 MMHG

## 2021-06-01 DIAGNOSIS — Z86.2 HISTORY OF ITP: Primary | ICD-10-CM

## 2021-06-01 PROCEDURE — 99213 OFFICE O/P EST LOW 20 MIN: CPT | Performed by: INTERNAL MEDICINE

## 2021-06-01 NOTE — PROGRESS NOTES
Francisca Roberts is a 67 y.o. female follow up for ITP. 1. Have you been to the ER, urgent care clinic since your last visit? Hospitalized since your last visit?no     2. Have you seen or consulted any other health care providers outside of the [address] [place] [address] since your last visit? Include any pap smears or colon screening.  no

## 2021-11-23 ENCOUNTER — TELEPHONE (OUTPATIENT)
Dept: ONCOLOGY | Age: 76
End: 2021-11-23

## 2021-11-23 NOTE — TELEPHONE ENCOUNTER
[address] at [place] [address]  [phone]    11/23/21- Phone call placed to pt to remind pt to have labs drawn prior to her follow up appointment with . Pt verbalized understanding.

## 2021-11-24 NOTE — PROGRESS NOTES
[facility] at [address], 2[address] [facility] [address] [place]: [phone]  F: [phone]      Reason for Visit:   Micah uFng is a 67 y.o. female who is seen for follow up of ITP. Treatment History:    · Diagnosed 6/3/2016  · IVIG 6/3/2016  · Prednisone from 6/3/2016 - approximately 11/15/2016  · IVIG 8/16/2016 - Relapsed once prednisone tapered to 5mg  · Rituximab weekly 9/15/2016 - 10/6/2016 for a total of 4 cycles     History of Present Illness:   She is feeling well, no new issues. Good energy. No bleeding or bruising. No recent infections. Review of systems was obtained and pertinent findings reviewed above. Past medical history, social history, family history, medications, and allergies are located in the electronic medical record. Physical Exam:     Visit Vitals  BP (!) 142/51 (BP 1 Location: Left upper arm, BP Patient Position: Sitting, BP Cuff Size: Large adult) Comment: . Pulse 65   Temp 98 °F (36.7 °C) (Temporal)   Resp 16   Ht 5' 4\" (1.626 m)   Wt 140 lb (63.5 kg)   SpO2 100%   BMI 24.03 kg/m²     General: no distress  Respiratory: normal respiratory effort  CV: no peripheral edema  Skin: no rashes; no ecchymoses; no petechiae      Results:     Lab Results   Component Value Date/Time    WBC 5.2 11/30/2021 09:45 AM    HGB 12.5 11/30/2021 09:45 AM    HCT 38.0 11/30/2021 09:45 AM    PLATELET 784 95/66/0881 09:45 AM    MCV 80 11/30/2021 09:45 AM    ABS.  NEUTROPHILS 2.3 11/30/2021 09:45 AM    Hemoglobin (POC) 14.3 10/06/2016 09:24 AM    Hematocrit (POC) 42 10/06/2016 09:24 AM     Lab Results   Component Value Date/Time    Sodium 138 08/17/2016 05:14 AM    Potassium 3.9 08/17/2016 05:14 AM    Chloride 104 08/17/2016 05:14 AM    CO2 27 08/17/2016 05:14 AM    Glucose 114 (H) 08/17/2016 05:14 AM    BUN 14 08/17/2016 05:14 AM    Creatinine 0.93 08/17/2016 05:14 AM    GFR est AA >60 08/17/2016 05:14 AM    GFR est non-AA 59 (L) 08/17/2016 05:14 AM    Calcium 9.4 08/17/2016 05:14 AM    Sodium (POC) 140 10/06/2016 09:24 AM    Potassium (POC) 3.5 10/06/2016 09:24 AM    Chloride (POC) 101 10/06/2016 09:24 AM    Glucose (POC) 83 10/06/2016 09:24 AM    BUN (POC) 17 10/06/2016 09:24 AM    Creatinine (POC) 1.0 10/06/2016 09:24 AM    Calcium, ionized (POC) 1.21 10/06/2016 09:24 AM     Lab Results   Component Value Date/Time    Bilirubin, total 0.6 08/17/2016 05:14 AM    ALT (SGPT) 20 08/17/2016 05:14 AM    Alk. phosphatase 44 (L) 08/17/2016 05:14 AM    Protein, total 8.5 (H) 08/17/2016 05:14 AM    Albumin 3.2 (L) 08/17/2016 05:14 AM    Globulin 5.3 (H) 08/17/2016 05:14 AM     Lab Results   Component Value Date/Time    Reticulocyte count 0.8 06/03/2016 01:28 PM    Iron % saturation 16 (L) 06/04/2016 03:46 AM    TIBC 231 (L) 06/04/2016 03:46 AM    Ferritin 128 06/03/2016 01:20 PM    Vitamin B12 231 06/03/2016 01:20 PM    Folate 10.7 06/03/2016 01:20 PM    Homocysteine, plasma 8.1 06/04/2016 04:00 PM    Methylmalonic acid 97 06/04/2016 04:00 PM    Haptoglobin 68 06/04/2016 03:46 AM     06/03/2016 01:28 PM    Hep C virus Ab Interp.  NONREACTIVE 06/04/2016 03:46 AM    HIV 1/2 Interpretation NONREACTIVE 06/03/2016 02:30 PM     Lab Results   Component Value Date/Time    INR 1.0 06/03/2016 01:28 PM    aPTT 33.5 (H) 06/03/2016 01:28 PM    Fibrinogen 250 06/03/2016 03:15 PM     PLATELET   Date Value   11/30/2021 255 x10E3/uL   05/28/2021 291 x10E3/uL   11/30/2020 275 x10E3/uL   05/26/2020 279 x10E3/uL   11/18/2019 274 x10E3/uL   05/21/2019 240 x10E3/uL   01/22/2019 243 x10E3/uL   09/24/2018 262 x10E3/uL   05/21/2018 258 x10E3/uL   10/27/2016 190 K/uL   10/06/2016 159 K/uL   09/29/2016 183 K/uL   09/22/2016 176 K/uL   09/15/2016 127 K/uL (L)   09/06/2016 221 K/uL   08/23/2016 306 K/uL   08/18/2016 89 K/uL (L)   08/17/2016 36 K/uL (LL)   08/16/2016 20 K/uL (LL)   07/29/2016 231 K/uL   06/08/2016 231 K/uL   06/05/2016 66 K/uL (L)   06/04/2016 59 K/uL (L)   06/04/2016 40 K/uL (LL)   06/03/2016 39 K/uL (LL)   06/03/2016 3 K/uL (LL)       Platelet count: (Full CBC scanned into media)  02/19/2018 279  11/14/2017 237  08/08/2017 225  05/08/2017 248  02/06/2017 242  01/24/2017 254  01/10/2017 278  12/27/2017 243  12/12/2017 247  11/28/2016 250  11/21/2016 250  11/14/2016 254 Prednisone stopped  11/07/2016 247 Pred dropped to 2.5 mg every other day  10/31/2016 210 Pred dropped to 2.5mg   10/27/2016 190 Pred dropped to 5mg  10/17/2016 157    10/10/2016 116 Pred dropped to 10mg 10/12  10/06/2016 159 Rituximab completed  09/29/2016 183 Pred dropped to 20mg   09/22/2016 176  09/15/2016 127 Rituximab weekly started  09/12/2016 129  09/06/2016 221  08/29/2016 269 Pred dropped to 40mg 8/30 08/23/2016 306  08/18/2016 89  08/16/2016 20 Pred increased to 60mg and IVIG x1  08/15/2016 5  07/20/2016 231  07/18/2016 274  Pred dropped to 10 mg 7/20 07/11/2016 269  07/05/2016 186  06/27/2016 146  06/20/2016 230   Pred dropped to 20 mg 6/22 06/14/2016 320 Pred dropped to 40 mg 6/15  06/08/2016 231  06/03/2016 3  Pred started at 60 mg daily and IVIG x1 dose    EGD 6/13/2016 by Dr. Za Dyson:    H. Pylori negative   Esophagus:normal, early schatzki's ring that appeared widely patent   Stomach: Small size hiatal hernia, otherwise mucosa within normal.   Duodenum/jejunum: normal    Colonoscopy 6/13/2016 by Dr. Za Dyson:    Terminal Ileum: normal   Cecum: normal   Ascending Colon: normal   Transverse Colon: normal   Descending Colon: normal   Sigmoid: normal   Rectum: normal, small internal hemorrhoids       Assessment:   1) ITP, remission  Diagnosed 6/3/2016, responded initially to IVIG and steroids, but relapsed when prednisone tapered to 5mg. Responded once again to IVIG and prednisone, but relapsed while still on prednisone 40mg. S/p Rituximab weekly x 4 cycles completed 10/2016, achieving remission. She is now followed with surveillance. Platelets remain normal at this time.     She is now over 5 years out from her rituximab. We discussed reducing our visits to yearly, alternating with her PCP so that her CBC is still monitored every 6 months. We also discussed the option of releasing her to follow with her PCP and returning to see me on an as needed basis. 2) Vaccine schedule  As of 3/2017 she has completed the vaccine schedule for pneumoccal, Hflu, and meninogoccal vaccines, in case she needs a splenectomy in the future. 3) Cancer screening  Still declines mammogram.  Last colonoscopy 6/2016 with Dr. Milan Alvarez, repeat due 2026. She completed her 3rd dose of COVID19 vaccine in 11/2021. Plan:     · CBC with PCP in about 6 months  · Labs in 12 months: CBC ([facility])  · Return to clinic in about 12 months    [id number] Geovanna Ordonez for video visits.     Signed By: Aren Schuster MD

## 2021-12-01 LAB
BASOPHILS # BLD AUTO: 0.1 X10E3/UL (ref 0–0.2)
BASOPHILS NFR BLD AUTO: 2 %
EOSINOPHIL # BLD AUTO: 0.2 X10E3/UL (ref 0–0.4)
EOSINOPHIL NFR BLD AUTO: 3 %
ERYTHROCYTE [DISTWIDTH] IN BLOOD BY AUTOMATED COUNT: 14.6 % (ref 11.7–15.4)
HCT VFR BLD AUTO: 38 % (ref 34–46.6)
HGB BLD-MCNC: 12.5 G/DL (ref 11.1–15.9)
IMM GRANULOCYTES # BLD AUTO: 0 X10E3/UL (ref 0–0.1)
IMM GRANULOCYTES NFR BLD AUTO: 0 %
LYMPHOCYTES # BLD AUTO: 2.4 X10E3/UL (ref 0.7–3.1)
LYMPHOCYTES NFR BLD AUTO: 46 %
MCH RBC QN AUTO: 26.4 PG (ref 26.6–33)
MCHC RBC AUTO-ENTMCNC: 32.9 G/DL (ref 31.5–35.7)
MCV RBC AUTO: 80 FL (ref 79–97)
MONOCYTES # BLD AUTO: 0.3 X10E3/UL (ref 0.1–0.9)
MONOCYTES NFR BLD AUTO: 6 %
NEUTROPHILS # BLD AUTO: 2.3 X10E3/UL (ref 1.4–7)
NEUTROPHILS NFR BLD AUTO: 43 %
PLATELET # BLD AUTO: 255 X10E3/UL (ref 150–450)
RBC # BLD AUTO: 4.73 X10E6/UL (ref 3.77–5.28)
WBC # BLD AUTO: 5.2 X10E3/UL (ref 3.4–10.8)

## 2021-12-02 ENCOUNTER — OFFICE VISIT (OUTPATIENT)
Dept: ONCOLOGY | Age: 76
End: 2021-12-02
Payer: MEDICAID

## 2021-12-02 VITALS
BODY MASS INDEX: 23.9 KG/M2 | HEIGHT: 64 IN | RESPIRATION RATE: 16 BRPM | WEIGHT: 140 LBS | HEART RATE: 65 BPM | SYSTOLIC BLOOD PRESSURE: 142 MMHG | DIASTOLIC BLOOD PRESSURE: 51 MMHG | TEMPERATURE: 98 F | OXYGEN SATURATION: 100 %

## 2021-12-02 DIAGNOSIS — Z86.2 HISTORY OF ITP: Primary | ICD-10-CM

## 2021-12-02 PROCEDURE — 99213 OFFICE O/P EST LOW 20 MIN: CPT | Performed by: INTERNAL MEDICINE

## 2021-12-02 NOTE — PROGRESS NOTES
Lokesh Grider is a 67 y.o. female follow up for ITP. 1. Have you been to the ER, urgent care clinic since your last visit? Hospitalized since your last visit?no     2. Have you seen or consulted any other health care providers outside of the [address] [place] [address] since your last visit? Include any pap smears or colon screening.  no

## 2022-11-23 NOTE — PROGRESS NOTES
[facility] at [address], 2329 [facility] [address] Child: [phone]  F: [phone]        Reason for Visit:   Ana Garcia is a 65 y.o. female who is seen for follow up of ITP. Treatment History:    Diagnosed 6/3/2016  IVIG 6/3/2016  Prednisone from 6/3/2016 - approximately 11/15/2016  IVIG 8/16/2016 - Relapsed once prednisone tapered to 5mg  Rituximab weekly 9/15/2016 - 10/6/2016 for a total of 4 cycles     History of Present Illness:   She reports doing well since last visit. No bleeding. Good energy. No new medical problems. Review of systems was obtained and pertinent findings reviewed above. Past medical history, social history, family history, medications, and allergies are located in the electronic medical record. Physical Exam:     Visit Vitals  BP (!) 139/55 (BP 1 Location: Left upper arm, BP Patient Position: Sitting, BP Cuff Size: Large adult)   Pulse 78   Temp 98 °F (36.7 °C) (Temporal)   Resp 18   Ht 5' 4\" (1.626 m)   Wt 143 lb (64.9 kg)   SpO2 96%   BMI 24.55 kg/m²       General: no distress  Respiratory: normal respiratory effort  CV: no peripheral edema  Skin: no rashes; no ecchymoses; no petechiae      Results:     Lab Results   Component Value Date/Time    WBC 6.1 11/29/2022 01:32 PM    HGB 12.6 11/29/2022 01:32 PM    HCT 38.6 11/29/2022 01:32 PM    PLATELET 180 27/23/4177 01:32 PM    MCV 80 11/29/2022 01:32 PM    ABS.  NEUTROPHILS 2.7 11/29/2022 01:32 PM    Hemoglobin (POC) 14.3 10/06/2016 09:24 AM    Hematocrit (POC) 42 10/06/2016 09:24 AM     Lab Results   Component Value Date/Time    Sodium 138 08/17/2016 05:14 AM    Potassium 3.9 08/17/2016 05:14 AM    Chloride 104 08/17/2016 05:14 AM    CO2 27 08/17/2016 05:14 AM    Glucose 114 (H) 08/17/2016 05:14 AM    BUN 14 08/17/2016 05:14 AM    Creatinine 0.93 08/17/2016 05:14 AM    GFR est AA >60 08/17/2016 05:14 AM    GFR est non-AA 59 (L) 08/17/2016 05:14 AM    Calcium 9.4 08/17/2016 05:14 AM    Sodium (POC) 140 10/06/2016 09:24 AM    Potassium (POC) 3.5 10/06/2016 09:24 AM    Chloride (POC) 101 10/06/2016 09:24 AM    Glucose (POC) 83 10/06/2016 09:24 AM    BUN (POC) 17 10/06/2016 09:24 AM    Creatinine (POC) 1.0 10/06/2016 09:24 AM    Calcium, ionized (POC) 1.21 10/06/2016 09:24 AM     Lab Results   Component Value Date/Time    Bilirubin, total 0.6 08/17/2016 05:14 AM    ALT (SGPT) 20 08/17/2016 05:14 AM    Alk. phosphatase 44 (L) 08/17/2016 05:14 AM    Protein, total 8.5 (H) 08/17/2016 05:14 AM    Albumin 3.2 (L) 08/17/2016 05:14 AM    Globulin 5.3 (H) 08/17/2016 05:14 AM     Lab Results   Component Value Date/Time    Reticulocyte count 0.8 06/03/2016 01:28 PM    Iron % saturation 16 (L) 06/04/2016 03:46 AM    TIBC 231 (L) 06/04/2016 03:46 AM    Ferritin 128 06/03/2016 01:20 PM    Vitamin B12 231 06/03/2016 01:20 PM    Folate 10.7 06/03/2016 01:20 PM    Homocysteine, plasma 8.1 06/04/2016 04:00 PM    Methylmalonic acid 97 06/04/2016 04:00 PM    Haptoglobin 68 06/04/2016 03:46 AM     06/03/2016 01:28 PM    Hep C virus Ab Interp.  NONREACTIVE 06/04/2016 03:46 AM    HIV 1/2 Interpretation NONREACTIVE 06/03/2016 02:30 PM     Lab Results   Component Value Date/Time    INR 1.0 06/03/2016 01:28 PM    aPTT 33.5 (H) 06/03/2016 01:28 PM    Fibrinogen 250 06/03/2016 03:15 PM     PLATELET   Date Value   11/29/2022 315 x10E3/uL   11/30/2021 255 x10E3/uL   05/28/2021 291 x10E3/uL   11/30/2020 275 x10E3/uL   05/26/2020 279 x10E3/uL   11/18/2019 274 x10E3/uL   05/21/2019 240 x10E3/uL   01/22/2019 243 x10E3/uL   09/24/2018 262 x10E3/uL   05/21/2018 258 x10E3/uL   10/27/2016 190 K/uL   10/06/2016 159 K/uL   09/29/2016 183 K/uL   09/22/2016 176 K/uL   09/15/2016 127 K/uL (L)   09/06/2016 221 K/uL   08/23/2016 306 K/uL   08/18/2016 89 K/uL (L)   08/17/2016 36 K/uL (LL)   08/16/2016 20 K/uL (LL)   07/29/2016 231 K/uL   06/08/2016 231 K/uL   06/05/2016 66 K/uL (L)   06/04/2016 59 K/uL (L) 06/04/2016 40 K/uL (LL)   06/03/2016 39 K/uL (LL)   06/03/2016 3 K/uL (LL)       Platelet count: (Full CBC scanned into media)  02/19/2018 279  11/14/2017 237  08/08/2017 225  05/08/2017 248  02/06/2017 242  01/24/2017 254  01/10/2017 278  12/27/2017 243  12/12/2017 247  11/28/2016 250  11/21/2016 250  11/14/2016 254 Prednisone stopped  11/07/2016 247 Pred dropped to 2.5 mg every other day  10/31/2016 210 Pred dropped to 2.5mg   10/27/2016 190 Pred dropped to 5mg  10/17/2016 157    10/10/2016 116 Pred dropped to 10mg 10/12  10/06/2016 159 Rituximab completed  09/29/2016 183 Pred dropped to 20mg   09/22/2016 176  09/15/2016 127 Rituximab weekly started  09/12/2016 129  09/06/2016 221  08/29/2016 269 Pred dropped to 40mg 8/30 08/23/2016 306  08/18/2016 89  08/16/2016 20 Pred increased to 60mg and IVIG x1  08/15/2016 5  07/20/2016 231  07/18/2016 274  Pred dropped to 10 mg 7/20 07/11/2016 269  07/05/2016 186  06/27/2016 146  06/20/2016 230   Pred dropped to 20 mg 6/22 06/14/2016 320 Pred dropped to 40 mg 6/15  06/08/2016 231  06/03/2016 3  Pred started at 60 mg daily and IVIG x1 dose    EGD 6/13/2016 by Dr. Chica Ramesh:    H. Pylori negative   Esophagus:normal, early schatzki's ring that appeared widely patent   Stomach: Small size hiatal hernia, otherwise mucosa within normal.   Duodenum/jejunum: normal    Colonoscopy 6/13/2016 by Dr. Chica Ramesh:    Terminal Ileum: normal   Cecum: normal   Ascending Colon: normal   Transverse Colon: normal   Descending Colon: normal   Sigmoid: normal   Rectum: normal, small internal hemorrhoids       Assessment:   1) ITP, remission  Diagnosed 6/3/2016, responded initially to IVIG and steroids, but relapsed when prednisone tapered to 5mg. Responded once again to IVIG and prednisone, but relapsed while still on prednisone 40mg. S/p Rituximab weekly x 4 cycles completed 10/2016, achieving remission. She is now followed with surveillance. Platelets remain normal at this time.     She is now over 6 years out from her rituximab. We discussed the option of releasing her to follow with her PCP and returning to see me on an as needed basis. She is agreeable. I recommend her CBC be checked at least once a year. 2) Vaccine schedule  As of 3/2017 she has completed the vaccine schedule for pneumoccal, Hflu, and meninogoccal vaccines, in case she needs a splenectomy in the future. 3) Cancer screening  Still declines mammogram.  Last colonoscopy 6/2016 with Dr. Lisa Azevedo, repeat due 2026. Plan:     CBC with PCP at least yearly  Return to see me if thrombocytopenia recurs    A total of 20 minutes were spent on the day of the visit reviewing the patients diagnostic tests, seeing the patient, and documenting in the record.       Signed By: Tess Moser MD

## 2022-12-01 ENCOUNTER — OFFICE VISIT (OUTPATIENT)
Dept: ONCOLOGY | Age: 77
End: 2022-12-01
Payer: MEDICAID

## 2022-12-01 VITALS
HEART RATE: 78 BPM | WEIGHT: 143 LBS | SYSTOLIC BLOOD PRESSURE: 139 MMHG | RESPIRATION RATE: 18 BRPM | HEIGHT: 64 IN | DIASTOLIC BLOOD PRESSURE: 55 MMHG | BODY MASS INDEX: 24.41 KG/M2 | OXYGEN SATURATION: 96 % | TEMPERATURE: 98 F

## 2022-12-01 DIAGNOSIS — Z86.2 HISTORY OF ITP: Primary | ICD-10-CM

## 2022-12-01 NOTE — PROGRESS NOTES
Cole Moritz is a 65 y.o. female follow up for ITP. 1. Have you been to the ER, urgent care clinic since your last visit? Hospitalized since your last visit?no     2. Have you seen or consulted any other health care providers outside of the [address] [place] [address] since your last visit? Include any pap smears or colon screening.  no

## 2023-08-29 NOTE — MR AVS SNAPSHOT
Visit Information Date & Time Provider Department Dept. Phone Encounter #  
 5/12/2017 10:30 AM MD [facility] at [address] [place] [address] [id number] Follow-up Instructions Return in about 3 months (around 8/12/2017) for SHANKAR Graham fu.  
 Follow-up and Disposition History Your Appointments 8/10/2017 10:30 AM  
ESTABLISHED PATIENT with MD Estephania [facility] at [facility]) Appt Note: 3 mo fu, [place] [address] [facility], [address] [facility] [id number]  
[phone]  
  
   
 [address] [facility], [address] Upcoming Health Maintenance Date Due DTaP/Tdap/Td series (1 - Tdap) 8/3/1965 BREAST CANCER SCRN MAMMOGRAM 8/3/1994 ZOSTER VACCINE AGE 60> 8/3/2004 GLAUCOMA SCREENING Q2Y 8/3/2009 OSTEOPOROSIS SCREENING (DEXA) 8/3/2009 MEDICARE YEARLY EXAM 8/3/2009 FOBT Q 1 YEAR AGE 50-75 6/3/2017 INFLUENZA AGE 9 TO ADULT 8/1/2017 Pneumococcal 65+ Low/Medium Risk (2 of 2 - PPSV23) 8/18/2017 Allergies as of 5/12/2017  Review Complete On: 5/12/2017 By: Paulina Putnam MD  
 No Known Allergies Current Immunizations  Reviewed on 10/6/2016 Name Date Hib (PRP-T) 8/18/2016  1:49 PM  
 Meningococcal (MCV4O) Vaccine 8/18/2016  1:47 PM  
 Pneumococcal Conjugate (PCV-13) 8/18/2016  1:48 PM  
  
 Not reviewed this visit You Were Diagnosed With   
  
 Codes Comments Acute ITP (HCC)    -  Primary ICD-10-CM: M47.4 ICD-9-CM: 287.31 Vitals BP Pulse Temp Resp Height(growth percentile) 135/57 (BP 1 Location: Left arm, BP Patient Position: Sitting) (!) 57 95.5 °F (35.3 °C) (Temporal) 20 5' 4\" (1.626 m) Weight(growth percentile) SpO2 BMI OB Status Smoking Status 139 lb 9.6 oz (63.3 kg) 100% 23.96 kg/m2 Postmenopausal Never Smoker Vitals History BMI and BSA Data Body Mass Index Body Surface Area 23.96 kg/m 2 1.69 m 2 Preferred Pharmacy Pharmacy Name Phone [facility], [address] [facility] [phone] Your Updated Medication List  
  
   
This list is accurate as of: 5/12/17 11:03 AM.  Always use your most recent med list.  
  
  
  
  
 PRAVASTATIN PO Take  by mouth. Follow-up Instructions Return in about 3 months (around 8/12/2017) for SHANKAR Graham.  
  
  
Introducing [facility]! Endy Christina introduces [employer] patient portal. Now you can access parts of your medical record, email your doctor's office, and request medication refills online. 1. In your internet browser, go to [email or web address] 2. Click on the First Time User? Click Here link in the Sign In box. You will see the New Member Sign Up page. 3. Enter your [employer] Access Code exactly as it appears below. You will not need to use this code after youve completed the sign-up process. If you do not sign up before the expiration date, you must request a new code. · [employer] Access Code: [email or web address] Expires: 8/10/2017 11:02 AM 
 
4. Enter the last four digits of your Social Security Number (xxxx) and Date of Birth (mm/dd/yyyy) as indicated and click Submit. You will be taken to the next sign-up page. 5. Create a [employer] ID. This will be your [employer] login ID and cannot be changed, so think of one that is secure and easy to remember. 6. Create a [employer] password. You can change your password at any time. 7. Enter your Password Reset Question and Answer. This can be used at a later time if you forget your password. 8. Enter your e-mail address. You will receive e-mail notification when new information is available in [address] 9. Click Sign Up. You can now view and download portions of your medical record. 10. Click the Download Summary menu link to download a portable copy of your medical information. If you have questions, please visit the Frequently Asked Questions section of the [employer] website. Remember, [employer] is NOT to be used for urgent needs. For medical emergencies, dial 911. Now available from your iPhone and Android! Please provide this summary of care documentation to your next provider. Your primary care clinician is listed as Roge [place] . If you have any questions after today's visit, please call [phone]. Detail Level: Simple